# Patient Record
Sex: MALE | Race: WHITE | NOT HISPANIC OR LATINO | Employment: OTHER | URBAN - NONMETROPOLITAN AREA
[De-identification: names, ages, dates, MRNs, and addresses within clinical notes are randomized per-mention and may not be internally consistent; named-entity substitution may affect disease eponyms.]

---

## 2023-09-28 ENCOUNTER — HOSPITAL ENCOUNTER (EMERGENCY)
Facility: HOSPITAL | Age: 78
Discharge: HOME | End: 2023-09-28
Attending: EMERGENCY MEDICINE
Payer: COMMERCIAL

## 2023-09-28 ENCOUNTER — APPOINTMENT (EMERGENCY)
Dept: RADIOLOGY | Facility: HOSPITAL | Age: 78
End: 2023-09-28
Payer: COMMERCIAL

## 2023-09-28 ENCOUNTER — APPOINTMENT (EMERGENCY)
Dept: CT IMAGING | Facility: HOSPITAL | Age: 78
End: 2023-09-28
Payer: COMMERCIAL

## 2023-09-28 VITALS
DIASTOLIC BLOOD PRESSURE: 68 MMHG | HEART RATE: 69 BPM | RESPIRATION RATE: 16 BRPM | SYSTOLIC BLOOD PRESSURE: 134 MMHG | WEIGHT: 196.65 LBS | TEMPERATURE: 98.4 F | OXYGEN SATURATION: 93 %

## 2023-09-28 DIAGNOSIS — S22.31XA RIGHT RIB FRACTURE: Primary | ICD-10-CM

## 2023-09-28 LAB
ALBUMIN SERPL BCP-MCNC: 4.4 G/DL (ref 3.5–5)
ALP SERPL-CCNC: 84 U/L (ref 34–104)
ALT SERPL W P-5'-P-CCNC: 25 U/L (ref 7–52)
ANION GAP SERPL CALCULATED.3IONS-SCNC: 13 MMOL/L
APTT PPP: 35 SECONDS (ref 23–37)
AST SERPL W P-5'-P-CCNC: 14 U/L (ref 13–39)
BASOPHILS # BLD AUTO: 0.05 THOUSANDS/ÂΜL (ref 0–0.1)
BASOPHILS NFR BLD AUTO: 1 % (ref 0–1)
BILIRUB SERPL-MCNC: 0.49 MG/DL (ref 0.2–1)
BUN SERPL-MCNC: 32 MG/DL (ref 5–25)
CALCIUM SERPL-MCNC: 10 MG/DL (ref 8.4–10.2)
CARDIAC TROPONIN I PNL SERPL HS: 6 NG/L (ref 8–18)
CHLORIDE SERPL-SCNC: 101 MMOL/L (ref 96–108)
CO2 SERPL-SCNC: 22 MMOL/L (ref 21–32)
CREAT SERPL-MCNC: 1.38 MG/DL (ref 0.6–1.3)
EOSINOPHIL # BLD AUTO: 0.2 THOUSAND/ÂΜL (ref 0–0.61)
EOSINOPHIL NFR BLD AUTO: 3 % (ref 0–6)
ERYTHROCYTE [DISTWIDTH] IN BLOOD BY AUTOMATED COUNT: 12.5 % (ref 11.6–15.1)
GFR SERPL CREATININE-BSD FRML MDRD: 49 ML/MIN/1.73SQ M
GLUCOSE SERPL-MCNC: 240 MG/DL (ref 65–140)
HCT VFR BLD AUTO: 48 % (ref 36.5–49.3)
HGB BLD-MCNC: 15.4 G/DL (ref 12–17)
IMM GRANULOCYTES # BLD AUTO: 0.03 THOUSAND/UL (ref 0–0.2)
IMM GRANULOCYTES NFR BLD AUTO: 0 % (ref 0–2)
INR PPP: 1.23 (ref 0.84–1.19)
LYMPHOCYTES # BLD AUTO: 1.74 THOUSANDS/ÂΜL (ref 0.6–4.47)
LYMPHOCYTES NFR BLD AUTO: 22 % (ref 14–44)
MCH RBC QN AUTO: 29.4 PG (ref 26.8–34.3)
MCHC RBC AUTO-ENTMCNC: 32.1 G/DL (ref 31.4–37.4)
MCV RBC AUTO: 92 FL (ref 82–98)
MONOCYTES # BLD AUTO: 0.69 THOUSAND/ÂΜL (ref 0.17–1.22)
MONOCYTES NFR BLD AUTO: 9 % (ref 4–12)
NEUTROPHILS # BLD AUTO: 5.33 THOUSANDS/ÂΜL (ref 1.85–7.62)
NEUTS SEG NFR BLD AUTO: 65 % (ref 43–75)
NRBC BLD AUTO-RTO: 0 /100 WBCS
PLATELET # BLD AUTO: 214 THOUSANDS/UL (ref 149–390)
PMV BLD AUTO: 10.8 FL (ref 8.9–12.7)
POTASSIUM SERPL-SCNC: 4.2 MMOL/L (ref 3.5–5.3)
PROT SERPL-MCNC: 7.8 G/DL (ref 6.4–8.4)
PROTHROMBIN TIME: 15.4 SECONDS (ref 11.6–14.5)
RBC # BLD AUTO: 5.23 MILLION/UL (ref 3.88–5.62)
SODIUM SERPL-SCNC: 136 MMOL/L (ref 135–147)
WBC # BLD AUTO: 8.04 THOUSAND/UL (ref 4.31–10.16)

## 2023-09-28 PROCEDURE — 85610 PROTHROMBIN TIME: CPT | Performed by: PHYSICIAN ASSISTANT

## 2023-09-28 PROCEDURE — 71260 CT THORAX DX C+: CPT

## 2023-09-28 PROCEDURE — 72125 CT NECK SPINE W/O DYE: CPT

## 2023-09-28 PROCEDURE — 36415 COLL VENOUS BLD VENIPUNCTURE: CPT | Performed by: PHYSICIAN ASSISTANT

## 2023-09-28 PROCEDURE — 70486 CT MAXILLOFACIAL W/O DYE: CPT

## 2023-09-28 PROCEDURE — 99285 EMERGENCY DEPT VISIT HI MDM: CPT | Performed by: PHYSICIAN ASSISTANT

## 2023-09-28 PROCEDURE — 74177 CT ABD & PELVIS W/CONTRAST: CPT

## 2023-09-28 PROCEDURE — 99285 EMERGENCY DEPT VISIT HI MDM: CPT

## 2023-09-28 PROCEDURE — 85025 COMPLETE CBC W/AUTO DIFF WBC: CPT | Performed by: PHYSICIAN ASSISTANT

## 2023-09-28 PROCEDURE — 71045 X-RAY EXAM CHEST 1 VIEW: CPT

## 2023-09-28 PROCEDURE — 84484 ASSAY OF TROPONIN QUANT: CPT | Performed by: PHYSICIAN ASSISTANT

## 2023-09-28 PROCEDURE — 80053 COMPREHEN METABOLIC PANEL: CPT | Performed by: PHYSICIAN ASSISTANT

## 2023-09-28 PROCEDURE — 93005 ELECTROCARDIOGRAM TRACING: CPT

## 2023-09-28 PROCEDURE — 85730 THROMBOPLASTIN TIME PARTIAL: CPT | Performed by: PHYSICIAN ASSISTANT

## 2023-09-28 PROCEDURE — 70450 CT HEAD/BRAIN W/O DYE: CPT

## 2023-09-28 RX ADMIN — IOHEXOL 100 ML: 350 INJECTION, SOLUTION INTRAVENOUS at 13:53

## 2023-09-28 NOTE — ED PROVIDER NOTES
Emergency Department Trauma Note  Jeff Gordon 69 y.o. male MRN: [id number]  Unit/Bed#: NU95/PY64 Encounter: [id number]      Trauma Alert: Trauma Acuity: Trauma Evaluation  Model of Arrival: Mode of Arrival: Other (Comment) (private vehicle) via    Trauma Team: Current Providers  Attending Provider: Jacoby Gonzales MD  Physician Assistant: João Parkinson PA-C  Registered Nurse: Anne Price RN  Consultants:     None      History of Present Illness     Chief Complaint:   Chief Complaint   Patient presents with   • Fall     Fall 2 days ago. Lost balance, head strike, takes xarelto daily. Complains of right rib pain      HPI:  Jeff Gordon is a 69 y.o. male who presents with contusion periorbitally right-sided right-sided chest wall pain status post fall 2 days ago. Mechanism:Details of Incident: fall 2 days ago hit head on end table. no LOC. bruise to right eye, pain to right ribs Injury Date: 09/26/23   Injury Occurence Location - [address] [place] [address] Street: [facility]    This is a 78-year-old male who is apparently new to our network, presents via private vehicle accompanied by his son via wheelchair for evaluation of pain status post fall. I briefly reviewed his care everywhere from [facility] it appears though he is on aspirin and Xarelto history of right-sided hemiparesis from prior stroke. At bedside he is alert and oriented x4 he provides his own history he states that he had a fall 2 days ago. He states he lost his balance causing him to fall striking the right side of his body off of the floor. His chief complaint is right-sided rib pain. He has contusion noted periorbitally right side of the eye.   Denies back pain or subjective abdominal pain however there is right upper quadrant tenderness on exam.  He did stand and pivot for us he denies lower extremity pain he is full range of motion of the lower extremities based upon the fact that he is a 78-year-old male with a fall on Xarelto he was personally made trauma evaluation will require bedside chest x-ray as part of the trauma evaluation to rule out pneumothorax hemothorax. Review of Systems   Constitutional: Negative for chills and fever. HENT: Negative for ear pain and sore throat. Eyes: Negative for pain and visual disturbance. Respiratory: Negative for cough and shortness of breath. Cardiovascular: Positive for chest pain. Negative for palpitations. Right-sided chest wall pain   Gastrointestinal: Negative for abdominal pain and vomiting. Genitourinary: Negative for dysuria and hematuria. Musculoskeletal: Negative for arthralgias and back pain. Skin: Negative for color change and rash. Contusion periorbitally right-sided   Neurological: Negative for seizures and syncope. All other systems reviewed and are negative. Historical Information     Immunizations:   Immunization History   Administered Date(s) Administered   • COVID-19 PFIZER VACCINE 0.3 ML IM 03/16/2021, 04/06/2021       History reviewed. No pertinent past medical history. History reviewed. No pertinent family history. History reviewed. No pertinent surgical history.   Social History     Tobacco Use   • Smoking status: Never   • Smokeless tobacco: Never     E-Cigarette/Vaping     E-Cigarette/Vaping Substances       Family History: non-contributory    Meds/Allergies   None       No Known Allergies    PHYSICAL EXAM    PE limited by: None    Objective   Vitals:   First set: Temperature: 98.4 °F (36.9 °C) (09/28/23 1330)  Pulse: 65 (09/28/23 1330)  Respirations: 18 (09/28/23 1330)  Blood Pressure: 150/73 (09/28/23 1330)  SpO2: 96 % (09/28/23 1330)    Primary Survey:   (A) Airway: Patent clear talking  (B) Breathing: Clear to auscultation bilaterally trachea midline no JVD  (C) Circulation: Pulses:   radial  4/4  (D) Disabliity:  GCS Total:  15  (E) Expose:  Completed    Secondary Survey: (Click on Physical Exam tab above)  Physical Exam  Vitals reviewed. Constitutional:       General: He is not in acute distress. Appearance: Normal appearance. He is normal weight. He is not ill-appearing, toxic-appearing or diaphoretic. HENT:      Head: Normocephalic and atraumatic. Comments: No evidence of Staples sign     Right Ear: Tympanic membrane, ear canal and external ear normal. There is no impacted cerumen. Left Ear: Tympanic membrane, ear canal and external ear normal. There is no impacted cerumen. Ears:      Comments: Negative hemotympanum bilaterally. No blood in the external canals. Nose: Nose normal. No congestion or rhinorrhea. Comments: Negative septal hematoma. Mouth/Throat:      Mouth: Mucous membranes are moist.      Pharynx: Oropharynx is clear. No oropharyngeal exudate or posterior oropharyngeal erythema. Eyes:      General:         Right eye: No discharge. Left eye: No discharge. Extraocular Movements: Extraocular movements intact. Conjunctiva/sclera: Conjunctivae normal.      Pupils: Pupils are equal, round, and reactive to light. Cardiovascular:      Rate and Rhythm: Normal rate and regular rhythm. Pulses: Normal pulses. Heart sounds: No murmur heard. No gallop. Pulmonary:      Effort: Pulmonary effort is normal. No respiratory distress. Breath sounds: Normal breath sounds. No stridor. No wheezing, rhonchi or rales. Comments: No evidence of flail chest.  Symmetric chest rise is noted. No jugular venous distension. Right-sided chest wall tenderness without contusion abrasion laceration or swelling  Chest:      Chest wall: Tenderness present. Abdominal:      General: Abdomen is flat. Bowel sounds are normal. There is no distension. Palpations: There is no mass. Tenderness: There is no abdominal tenderness. There is no right CVA tenderness, left CVA tenderness, guarding or rebound. Hernia: No hernia is present.       Comments: Right upper quadrant abdominal tenderness   Musculoskeletal:         General: Swelling and tenderness present. No deformity. Normal range of motion. Cervical back: Normal range of motion. No rigidity or tenderness. Right lower leg: No edema. Left lower leg: No edema. Comments: No evidence of central spinal tenderness. No crepitus step-offs contusion abrasion laceration or swelling      There is tenderness soft tissue swelling noted of the right periorbital region. No active bleeding. There is no central spinal tenderness, no evidence of depressed skull fracture on physical examination. Skin:     General: Skin is warm. Capillary Refill: Capillary refill takes less than 2 seconds. Coloration: Skin is not jaundiced or pale. Findings: No bruising, erythema, lesion or rash. Neurological:      General: No focal deficit present. Mental Status: He is alert and oriented to person, place, and time. Mental status is at baseline. Psychiatric:         Mood and Affect: Mood normal.     Right upper extremity hemiparesis is noted, chronic baseline. No other acute neurologic deficits noted    Cervical spine cleared by clinical criteria? No (imaging required)    Patient's cervical spine was personally evaluated. There is no evidence of contusion, abrasion, laceration or swelling. No cervical spine tenderness. Patient has full range of motion both actively and passively against resistance without pain elicited. Cervical spine is considered clear.   Invasive Devices     Peripheral Intravenous Line  Duration           Peripheral IV 09/28/23 Left;Upper Arm <1 day                Lab Results:   Results Reviewed     Procedure Component Value Units Date/Time    High Sensitivity Troponin I Random [[id number]]  (Abnormal) Collected: 09/28/23 1340    Lab Status: Final result Specimen: Blood from Arm, Left Updated: 09/28/23 1406     HS TnI random 6 ng/L     Comprehensive metabolic panel [[id number]] (Abnormal) Collected: 09/28/23 1340    Lab Status: Final result Specimen: Blood from Arm, Left Updated: 09/28/23 1400     Sodium 136 mmol/L      Potassium 4.2 mmol/L      Chloride 101 mmol/L      CO2 22 mmol/L      ANION GAP 13 mmol/L      BUN 32 mg/dL      Creatinine 1.38 mg/dL      Glucose 240 mg/dL      Calcium 10.0 mg/dL      AST 14 U/L      ALT 25 U/L      Alkaline Phosphatase 84 U/L      Total Protein 7.8 g/dL      Albumin 4.4 g/dL      Total Bilirubin 0.49 mg/dL      eGFR 49 ml/min/1.73sq m     Narrative:      [facility] guidelines for Chronic Kidney Disease (CKD):   •  Stage 1 with normal or high GFR (GFR > 90 mL/min/1.73 square meters)  •  Stage 2 Mild CKD (GFR = 60-89 mL/min/1.73 square meters)  •  Stage 3A Moderate CKD (GFR = 45-59 mL/min/1.73 square meters)  •  Stage 3B Moderate CKD (GFR = 30-44 mL/min/1.73 square meters)  •  Stage 4 Severe CKD (GFR = 15-29 mL/min/1.73 square meters)  •  Stage 5 End Stage CKD (GFR <15 mL/min/1.73 square meters)  Note: GFR calculation is accurate only with a steady state creatinine    APTT [[id number]]  (Normal) Collected: 09/28/23 1340    Lab Status: Final result Specimen: Blood from Arm, Left Updated: 09/28/23 1358     PTT 35 seconds     Protime-INR [[id number]]  (Abnormal) Collected: 09/28/23 1340    Lab Status: Final result Specimen: Blood from Arm, Left Updated: 09/28/23 1358     Protime 15.4 seconds      INR 1.23    CBC and differential [[id number]] Collected: 09/28/23 1340    Lab Status: Final result Specimen: Blood from Arm, Left Updated: 09/28/23 1344     WBC 8.04 Thousand/uL      RBC 5.23 Million/uL      Hemoglobin 15.4 g/dL      Hematocrit 48.0 %      MCV 92 fL      MCH 29.4 pg      MCHC 32.1 g/dL      RDW 12.5 %      MPV 10.8 fL      Platelets 418 Thousands/uL      nRBC 0 /100 WBCs      Neutrophils Relative 65 %      Immat GRANS % 0 %      Lymphocytes Relative 22 %      Monocytes Relative 9 %      Eosinophils Relative 3 %      Basophils Relative 1 %      Neutrophils Absolute 5.33 Thousands/µL      Immature Grans Absolute 0.03 Thousand/uL      Lymphocytes Absolute 1.74 Thousands/µL      Monocytes Absolute 0.69 Thousand/µL      Eosinophils Absolute 0.20 Thousand/µL      Basophils Absolute 0.05 Thousands/µL     UA (URINE) with reflex to Scope [[id number]]     Lab Status: No result Specimen: Urine                  Imaging Studies:   Direct to CT: No  TRAUMA - CT head wo contrast   Final Result by Paul Cullen MD (09/28 1421)      No acute intracranial abnormality. Chronic left encephalomalacia. Workstation performed: [id number]         TRAUMA - CT spine cervical wo contrast   Final Result by Paul Cullen MD (09/28 1424)      No cervical spine fracture or traumatic malalignment. Workstation performed: [id number]         TRAUMA - CT chest abdomen pelvis w contrast   Final Result by Paul Cullen MD (09/28 1440)   Subtle questionable nondisplaced fractures of the posterior right 10th and 11th ribs. Prior healed fractures noted as well. Otherwise no acute thoracic or abdominal pelvic trauma. Right pleural thickening and partial pleural calcification in the posterior thorax suggesting sequela of prior insult, possibly related to prior hemothorax or empyema. The study was marked in [facility] for immediate notification. Workstation performed: [id number]         TRAUMA - CT facial bones wo contrast   Final Result by Paul Cullen MD (09/28 1427)      Normal noncontrast CT of the facial bones. Workstation performed: [id number]         XR Trauma chest portable   Final Result by Maddi Schulte MD (09/28 1450)      No acute cardiopulmonary disease.                   Workstation performed: [id number]               Procedures  ECG 12 Lead Documentation Only    Date/Time: 9/28/2023 1:41 PM    Performed by: João Parkinson YKLE  Authorized by: Rafat Charles PA-C    Indications / Diagnosis:  Trauma  ECG reviewed by me, the ED Provider: yes    Patient location:  ED  Previous ECG:     Previous ECG:  Unavailable  Interpretation:     Interpretation: normal    Rate:     ECG rate:  66    ECG rate assessment: normal    Rhythm:     Rhythm: sinus rhythm    Ectopy:     Ectopy: none    QRS:     QRS axis:  Normal    QRS intervals:  Normal  Conduction:     Conduction: normal    ST segments:     ST segments:  Normal  T waves:     T waves: normal               ED Course  ED Course as of 09/28/23 1521   Thu Sep 28, 2023   1404 CBC reviewed there is no leukocytosis or anemia no thrombocytopenia coags within reasonable limits CMP reviewed there is elevation of creatinine however unsure if this is his chronic baseline he has never been to this facility before. No electrolyte disturbance. 1412 HS TnI random(!): 6   1412 Lab within reasonable limits, awaiting urine sample and imaging interps   1428 CT scan of the cervical spine and head are without acute traumatic process, awaiting CT chest abdomen pelvis and facial bones. 1429 Facial bones without fracture per interpretation   1455 IMPRESSION:  Subtle questionable nondisplaced fractures of the posterior right 10th and 11th ribs. Prior healed fractures noted as well.     Otherwise no acute thoracic or abdominal pelvic trauma.     Right pleural thickening and partial pleural calcification in the posterior thorax suggesting sequela of prior insult, possibly related to prior hemothorax or empyema.     1455 FINDINGS:     Cardiomediastinal silhouette appears unremarkable.     The lungs are clear. No pneumothorax or pleural effusion.     Osseous structures appear within normal limits for patient age.     IMPRESSION:     No acute cardiopulmonary disease. Medical Decision Making  15-year-old male trauma evaluation. Fall 2 days ago provides own history.   Right-sided facial contusion subjective right-sided chest wall pain. Has abdominal pain on examination no cervical spine tenderness he will be evaluated for presence of intracranial hemorrhage, fracture of the skull, orbital fracture, pneumothorax, hemothorax, pulmonary contusion, posttraumatic pneumonia, acute hemorrhage of the abdomen in terms of the liver laceration or hematoma. He will have medical evaluation for urinary tract infection electrolyte disturbance as well as acute kidney injury. Traumatic evaluation is complete. CT scan of the head and facial bones not reveal fracture or bleeding, cervical spine is without malalignment or fracture, chest abdomen pelvis reveals what is likely nondisplaced acute right posterior lateral 10th and 11th rib fractures however no active acute hemothorax no pneumothorax. There does appear to be prior evidence of pleural thickening. Old rib fracture is healing as well this likely may be from the same event. Laboratory evaluation within reasonable limits. Son is at bedside now. Repeat evaluation does not reveal any shortness of breath his oxygen saturation is 96% on room air. He is stable for discharge home. Amount and/or Complexity of Data Reviewed  Labs: ordered. Decision-making details documented in ED Course. Radiology: ordered. Risk  Prescription drug management.                   Disposition  [facility] Transfer: No  Final diagnoses:   Right rib fracture - 10 and 11 nondisplaced     Time reflects when diagnosis was documented in both MDM as applicable and the Disposition within this note     Time User Action Codes Description Comment    9/28/2023  2:58 PM Selin COOK Add Lenore Sb Right rib fracture     9/28/2023  2:58 PM Jessica Wilkerson Modify Lenore Sb Right rib fracture 10 and 11 nondisplaced      ED Disposition     ED Disposition   Discharge    Condition   Stable    Date/Time   Thu Sep 28, 2023  2:57 PM    Comment   Rizwana  [place] discharge to home/self care.               Follow-up Information     Follow up With Specialties Details Why Contact Info    PCP  Schedule an appointment as soon as possible for a visit           Patient's Medications    No medications on file     No discharge procedures on file.     PDMP Review     None          ED Provider  Electronically Signed by         Meagan Sepulveda PA-C  09/28/23 1522

## 2023-09-29 LAB
ATRIAL RATE: 66 BPM
P AXIS: 42 DEGREES
PR INTERVAL: 146 MS
QRS AXIS: 41 DEGREES
QRSD INTERVAL: 72 MS
QT INTERVAL: 400 MS
QTC INTERVAL: 419 MS
T WAVE AXIS: 79 DEGREES
VENTRICULAR RATE: 66 BPM

## 2023-09-29 PROCEDURE — 93010 ELECTROCARDIOGRAM REPORT: CPT | Performed by: INTERNAL MEDICINE

## 2024-10-24 ENCOUNTER — APPOINTMENT (EMERGENCY)
Dept: RADIOLOGY | Facility: HOSPITAL | Age: 79
DRG: 562 | End: 2024-10-24
Payer: MEDICARE

## 2024-10-24 ENCOUNTER — APPOINTMENT (EMERGENCY)
Dept: CT IMAGING | Facility: HOSPITAL | Age: 79
DRG: 562 | End: 2024-10-24
Payer: MEDICARE

## 2024-10-24 ENCOUNTER — HOSPITAL ENCOUNTER (INPATIENT)
Facility: HOSPITAL | Age: 79
LOS: 3 days | Discharge: NURSING, REHAB OR LONG-TERM CARE | DRG: 562 | End: 2024-10-28
Attending: EMERGENCY MEDICINE | Admitting: INTERNAL MEDICINE
Payer: MEDICARE

## 2024-10-24 DIAGNOSIS — T07.XXXA FRACTURES INVOLVING MULTIPLE BODY REGIONS: Primary | ICD-10-CM

## 2024-10-24 DIAGNOSIS — S62.332A: ICD-10-CM

## 2024-10-24 DIAGNOSIS — T14.8XXA CONTUSION: ICD-10-CM

## 2024-10-24 DIAGNOSIS — S62.511A: ICD-10-CM

## 2024-10-24 DIAGNOSIS — E87.20 METABOLIC ACIDOSIS: ICD-10-CM

## 2024-10-24 DIAGNOSIS — S42.301A RIGHT HUMERAL FRACTURE: ICD-10-CM
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
## 2024-10-24 DIAGNOSIS — E87.1 HYPONATREMIA: ICD-10-CM

## 2024-10-24 DIAGNOSIS — S42.302A LEFT HUMERAL FRACTURE: ICD-10-CM

## 2024-10-24 LAB
ABO GROUP BLD: NORMAL
ALBUMIN SERPL BCG-MCNC: 4.1 G/DL (ref 3.5–5)
ALP SERPL-CCNC: 80 U/L (ref 34–104)
ALT SERPL W P-5'-P-CCNC: 17 U/L (ref 7–52)
ANION GAP SERPL CALCULATED.3IONS-SCNC: 15 MMOL/L (ref 4–13)
APTT PPP: 29 SECONDS (ref 23–34)
AST SERPL W P-5'-P-CCNC: 18 U/L (ref 13–39)
BASOPHILS # BLD AUTO: 0.04 THOUSANDS/ΜL (ref 0–0.1)
BASOPHILS NFR BLD AUTO: 1 % (ref 0–1)
BILIRUB SERPL-MCNC: 0.98 MG/DL (ref 0.2–1)
BLD GP AB SCN SERPL QL: NEGATIVE
BUN SERPL-MCNC: 35 MG/DL (ref 5–25)
CALCIUM SERPL-MCNC: 9.8 MG/DL (ref 8.4–10.2)
CHLORIDE SERPL-SCNC: 100 MMOL/L (ref 96–108)
CO2 SERPL-SCNC: 19 MMOL/L (ref 21–32)
CREAT SERPL-MCNC: 1.48 MG/DL (ref 0.6–1.3)
EOSINOPHIL # BLD AUTO: 0.14 THOUSAND/ΜL (ref 0–0.61)
EOSINOPHIL NFR BLD AUTO: 2 % (ref 0–6)
ERYTHROCYTE [DISTWIDTH] IN BLOOD BY AUTOMATED COUNT: 12.9 % (ref 11.6–15.1)
GFR SERPL CREATININE-BSD FRML MDRD: 44 ML/MIN/1.73SQ M
GLUCOSE SERPL-MCNC: 274 MG/DL (ref 65–140)
HCT VFR BLD AUTO: 41 % (ref 36.5–49.3)
HGB BLD-MCNC: 13.7 G/DL (ref 12–17)
IMM GRANULOCYTES # BLD AUTO: 0.1 THOUSAND/UL (ref 0–0.2)
IMM GRANULOCYTES NFR BLD AUTO: 1 % (ref 0–2)
INR PPP: 1.05 (ref 0.85–1.19)
LYMPHOCYTES # BLD AUTO: 2.1 THOUSANDS/ΜL (ref 0.6–4.47)
LYMPHOCYTES NFR BLD AUTO: 27 % (ref 14–44)
MAGNESIUM SERPL-MCNC: 1.8 MG/DL (ref 1.9–2.7)
MCH RBC QN AUTO: 30.2 PG (ref 26.8–34.3)
MCHC RBC AUTO-ENTMCNC: 33.4 G/DL (ref 31.4–37.4)
MCV RBC AUTO: 90 FL (ref 82–98)
MONOCYTES # BLD AUTO: 0.75 THOUSAND/ΜL (ref 0.17–1.22)
MONOCYTES NFR BLD AUTO: 10 % (ref 4–12)
NEUTROPHILS # BLD AUTO: 4.6 THOUSANDS/ΜL (ref 1.85–7.62)
NEUTS SEG NFR BLD AUTO: 59 % (ref 43–75)
NRBC BLD AUTO-RTO: 0 /100 WBCS
PHOSPHATE SERPL-MCNC: 2.5 MG/DL (ref 2.3–4.1)
PLATELET # BLD AUTO: 243 THOUSANDS/UL (ref 149–390)
PMV BLD AUTO: 10.7 FL (ref 8.9–12.7)
POTASSIUM SERPL-SCNC: 4.9 MMOL/L (ref 3.5–5.3)
PROT SERPL-MCNC: 7.3 G/DL (ref 6.4–8.4)
PROTHROMBIN TIME: 14.2 SECONDS (ref 12.3–15)
RBC # BLD AUTO: 4.54 MILLION/UL (ref 3.88–5.62)
RH BLD: POSITIVE
SODIUM SERPL-SCNC: 134 MMOL/L (ref 135–147)
SPECIMEN EXPIRATION DATE: NORMAL
WBC # BLD AUTO: 7.73 THOUSAND/UL (ref 4.31–10.16)

## 2024-10-24 PROCEDURE — 85610 PROTHROMBIN TIME: CPT | Performed by: EMERGENCY MEDICINE

## 2024-10-24 PROCEDURE — 83735 ASSAY OF MAGNESIUM: CPT | Performed by: EMERGENCY MEDICINE

## 2024-10-24 PROCEDURE — 85025 COMPLETE CBC W/AUTO DIFF WBC: CPT | Performed by: EMERGENCY MEDICINE

## 2024-10-24 PROCEDURE — 72125 CT NECK SPINE W/O DYE: CPT

## 2024-10-24 PROCEDURE — 93005 ELECTROCARDIOGRAM TRACING: CPT

## 2024-10-24 PROCEDURE — 29125 APPL SHORT ARM SPLINT STATIC: CPT | Performed by: EMERGENCY MEDICINE

## 2024-10-24 PROCEDURE — 73130 X-RAY EXAM OF HAND: CPT

## 2024-10-24 PROCEDURE — 86900 BLOOD TYPING SEROLOGIC ABO: CPT | Performed by: EMERGENCY MEDICINE

## 2024-10-24 PROCEDURE — 80053 COMPREHEN METABOLIC PANEL: CPT | Performed by: EMERGENCY MEDICINE

## 2024-10-24 PROCEDURE — 73030 X-RAY EXAM OF SHOULDER: CPT

## 2024-10-24 PROCEDURE — 99285 EMERGENCY DEPT VISIT HI MDM: CPT | Performed by: EMERGENCY MEDICINE

## 2024-10-24 PROCEDURE — 73090 X-RAY EXAM OF FOREARM: CPT

## 2024-10-24 PROCEDURE — 96375 TX/PRO/DX INJ NEW DRUG ADDON: CPT

## 2024-10-24 PROCEDURE — 86901 BLOOD TYPING SEROLOGIC RH(D): CPT | Performed by: EMERGENCY MEDICINE

## 2024-10-24 PROCEDURE — 73060 X-RAY EXAM OF HUMERUS: CPT

## 2024-10-24 PROCEDURE — 96365 THER/PROPH/DIAG IV INF INIT: CPT

## 2024-10-24 PROCEDURE — 74177 CT ABD & PELVIS W/CONTRAST: CPT

## 2024-10-24 PROCEDURE — 70450 CT HEAD/BRAIN W/O DYE: CPT

## 2024-10-24 PROCEDURE — 86850 RBC ANTIBODY SCREEN: CPT | Performed by: EMERGENCY MEDICINE

## 2024-10-24 PROCEDURE — 71260 CT THORAX DX C+: CPT

## 2024-10-24 PROCEDURE — 73110 X-RAY EXAM OF WRIST: CPT

## 2024-10-24 PROCEDURE — 36415 COLL VENOUS BLD VENIPUNCTURE: CPT | Performed by: EMERGENCY MEDICINE

## 2024-10-24 PROCEDURE — 84100 ASSAY OF PHOSPHORUS: CPT | Performed by: EMERGENCY MEDICINE

## 2024-10-24 PROCEDURE — 99285 EMERGENCY DEPT VISIT HI MDM: CPT

## 2024-10-24 PROCEDURE — 85730 THROMBOPLASTIN TIME PARTIAL: CPT | Performed by: EMERGENCY MEDICINE

## 2024-10-24 PROCEDURE — 1123F ACP DISCUSS/DSCN MKR DOCD: CPT | Performed by: EMERGENCY MEDICINE

## 2024-10-24 RX ORDER — ACETAMINOPHEN 160 MG
2000 TABLET,DISINTEGRATING ORAL EVERY MORNING
COMMUNITY

## 2024-10-24 RX ORDER — EMPAGLIFLOZIN 10 MG/1
1 TABLET, FILM COATED ORAL DAILY
COMMUNITY
Start: 2024-10-13

## 2024-10-24 RX ORDER — SEMAGLUTIDE 0.68 MG/ML
INJECTION, SOLUTION SUBCUTANEOUS
COMMUNITY
Start: 2024-08-28

## 2024-10-24 RX ORDER — AMLODIPINE AND BENAZEPRIL HYDROCHLORIDE 5; 20 MG/1; MG/1
1 CAPSULE ORAL DAILY
COMMUNITY
End: 2024-10-28

## 2024-10-24 RX ORDER — CITALOPRAM HYDROBROMIDE 40 MG/1
40 TABLET ORAL DAILY
COMMUNITY
Start: 2024-07-01

## 2024-10-24 RX ORDER — METOPROLOL SUCCINATE 25 MG/1
25 TABLET, EXTENDED RELEASE ORAL DAILY
COMMUNITY
Start: 2024-10-21

## 2024-10-24 RX ORDER — FENTANYL CITRATE 50 UG/ML
50 INJECTION, SOLUTION INTRAMUSCULAR; INTRAVENOUS ONCE
Status: COMPLETED | OUTPATIENT
Start: 2024-10-24 | End: 2024-10-24

## 2024-10-24 RX ORDER — MAGNESIUM SULFATE HEPTAHYDRATE 40 MG/ML
2 INJECTION, SOLUTION INTRAVENOUS ONCE
Status: COMPLETED | OUTPATIENT
Start: 2024-10-24 | End: 2024-10-24

## 2024-10-24 RX ORDER — KETAMINE HCL IN NACL, ISO-OSM 100MG/10ML
15 SYRINGE (ML) INJECTION ONCE AS NEEDED
Status: DISCONTINUED | OUTPATIENT
Start: 2024-10-24 | End: 2024-10-24

## 2024-10-24 RX ORDER — HYDROMORPHONE HCL/PF 1 MG/ML
0.5 SYRINGE (ML) INJECTION ONCE
Status: COMPLETED | OUTPATIENT
Start: 2024-10-24 | End: 2024-10-24

## 2024-10-24 RX ORDER — KETAMINE HCL IN NACL, ISO-OSM 100MG/10ML
10 SYRINGE (ML) INJECTION ONCE AS NEEDED
Status: COMPLETED | OUTPATIENT
Start: 2024-10-24 | End: 2024-10-24

## 2024-10-24 RX ORDER — ATORVASTATIN CALCIUM 40 MG/1
40 TABLET, FILM COATED ORAL EVERY MORNING
COMMUNITY

## 2024-10-24 RX ADMIN — FENTANYL CITRATE 50 MCG: 50 INJECTION INTRAMUSCULAR; INTRAVENOUS at 21:27

## 2024-10-24 RX ADMIN — IOHEXOL 100 ML: 350 INJECTION, SOLUTION INTRAVENOUS at 22:14

## 2024-10-24 RX ADMIN — HYDROMORPHONE HYDROCHLORIDE 0.5 MG: 1 INJECTION, SOLUTION INTRAMUSCULAR; INTRAVENOUS; SUBCUTANEOUS at 23:51

## 2024-10-24 RX ADMIN — MAGNESIUM SULFATE HEPTAHYDRATE 2 G: 40 INJECTION, SOLUTION INTRAVENOUS at 22:48

## 2024-10-24 RX ADMIN — Medication 10 MG: at 21:59

## 2024-10-25 PROBLEM — S62.332A: Status: ACTIVE | Noted: 2024-10-25

## 2024-10-25 PROBLEM — K21.9 GERD (GASTROESOPHAGEAL REFLUX DISEASE): Status: ACTIVE | Noted: 2024-10-25

## 2024-10-25 PROBLEM — S42.201A CLOSED FRACTURE OF RIGHT PROXIMAL HUMERUS: Status: ACTIVE | Noted: 2024-10-25

## 2024-10-25 PROBLEM — E11.9 TYPE 2 DIABETES MELLITUS (HCC): Status: ACTIVE | Noted: 2024-10-25

## 2024-10-25 PROBLEM — I10 PRIMARY HYPERTENSION: Status: ACTIVE | Noted: 2024-10-25

## 2024-10-25 PROBLEM — F32.9 MAJOR DEPRESSIVE DISORDER: Status: ACTIVE | Noted: 2024-10-25

## 2024-10-25 PROBLEM — T07.XXXA FRACTURES INVOLVING MULTIPLE BODY REGIONS: Status: ACTIVE | Noted: 2024-10-25

## 2024-10-25 LAB
ABO GROUP BLD: NORMAL
ANION GAP SERPL CALCULATED.3IONS-SCNC: 10 MMOL/L (ref 4–13)
ATRIAL RATE: 74 BPM
B-OH-BUTYR SERPL-MCNC: 0.1 MMOL/L (ref 0.02–0.27)
BASOPHILS # BLD AUTO: 0.06 THOUSANDS/ΜL (ref 0–0.1)
BASOPHILS NFR BLD AUTO: 1 % (ref 0–1)
BUN SERPL-MCNC: 36 MG/DL (ref 5–25)
CALCIUM SERPL-MCNC: 9.6 MG/DL (ref 8.4–10.2)
CHLORIDE SERPL-SCNC: 104 MMOL/L (ref 96–108)
CO2 SERPL-SCNC: 24 MMOL/L (ref 21–32)
CREAT SERPL-MCNC: 1.46 MG/DL (ref 0.6–1.3)
EOSINOPHIL # BLD AUTO: 0.2 THOUSAND/ΜL (ref 0–0.61)
EOSINOPHIL NFR BLD AUTO: 3 % (ref 0–6)
ERYTHROCYTE [DISTWIDTH] IN BLOOD BY AUTOMATED COUNT: 13 % (ref 11.6–15.1)
EST. AVERAGE GLUCOSE BLD GHB EST-MCNC: 269 MG/DL
GFR SERPL CREATININE-BSD FRML MDRD: 45 ML/MIN/1.73SQ M
GLUCOSE SERPL-MCNC: 180 MG/DL (ref 65–140)
GLUCOSE SERPL-MCNC: 182 MG/DL (ref 65–140)
GLUCOSE SERPL-MCNC: 194 MG/DL (ref 65–140)
GLUCOSE SERPL-MCNC: 209 MG/DL (ref 65–140)
GLUCOSE SERPL-MCNC: 217 MG/DL (ref 65–140)
GLUCOSE SERPL-MCNC: 296 MG/DL (ref 65–140)
HBA1C MFR BLD: 11 %
HCT VFR BLD AUTO: 38.2 % (ref 36.5–49.3)
HGB BLD-MCNC: 12.8 G/DL (ref 12–17)
IMM GRANULOCYTES # BLD AUTO: 0.08 THOUSAND/UL (ref 0–0.2)
IMM GRANULOCYTES NFR BLD AUTO: 1 % (ref 0–2)
LYMPHOCYTES # BLD AUTO: 2.18 THOUSANDS/ΜL (ref 0.6–4.47)
LYMPHOCYTES NFR BLD AUTO: 31 % (ref 14–44)
MAGNESIUM SERPL-MCNC: 2.5 MG/DL (ref 1.9–2.7)
MCH RBC QN AUTO: 29.6 PG (ref 26.8–34.3)
MCHC RBC AUTO-ENTMCNC: 33.5 G/DL (ref 31.4–37.4)
MCV RBC AUTO: 88 FL (ref 82–98)
MONOCYTES # BLD AUTO: 0.75 THOUSAND/ΜL (ref 0.17–1.22)
MONOCYTES NFR BLD AUTO: 11 % (ref 4–12)
NEUTROPHILS # BLD AUTO: 3.73 THOUSANDS/ΜL (ref 1.85–7.62)
NEUTS SEG NFR BLD AUTO: 53 % (ref 43–75)
NRBC BLD AUTO-RTO: 0 /100 WBCS
P AXIS: 61 DEGREES
PHOSPHATE SERPL-MCNC: 3.7 MG/DL (ref 2.3–4.1)
PLATELET # BLD AUTO: 222 THOUSANDS/UL (ref 149–390)
PMV BLD AUTO: 10.7 FL (ref 8.9–12.7)
POTASSIUM SERPL-SCNC: 4.3 MMOL/L (ref 3.5–5.3)
PR INTERVAL: 130 MS
QRS AXIS: 62 DEGREES
QRSD INTERVAL: 70 MS
QT INTERVAL: 420 MS
QTC INTERVAL: 466 MS
RBC # BLD AUTO: 4.32 MILLION/UL (ref 3.88–5.62)
RH BLD: POSITIVE
SODIUM SERPL-SCNC: 138 MMOL/L (ref 135–147)
T WAVE AXIS: 24 DEGREES
VENTRICULAR RATE: 74 BPM
WBC # BLD AUTO: 7 THOUSAND/UL (ref 4.31–10.16)

## 2024-10-25 PROCEDURE — 93010 ELECTROCARDIOGRAM REPORT: CPT | Performed by: INTERNAL MEDICINE

## 2024-10-25 PROCEDURE — 82010 KETONE BODYS QUAN: CPT | Performed by: INTERNAL MEDICINE

## 2024-10-25 PROCEDURE — 99222 1ST HOSP IP/OBS MODERATE 55: CPT | Performed by: STUDENT IN AN ORGANIZED HEALTH CARE EDUCATION/TRAINING PROGRAM

## 2024-10-25 PROCEDURE — 23600 CLTX PROX HUMRL FX W/O MNPJ: CPT | Performed by: STUDENT IN AN ORGANIZED HEALTH CARE EDUCATION/TRAINING PROGRAM

## 2024-10-25 PROCEDURE — 80048 BASIC METABOLIC PNL TOTAL CA: CPT | Performed by: INTERNAL MEDICINE

## 2024-10-25 PROCEDURE — 82948 REAGENT STRIP/BLOOD GLUCOSE: CPT

## 2024-10-25 PROCEDURE — 97163 PT EVAL HIGH COMPLEX 45 MIN: CPT

## 2024-10-25 PROCEDURE — 83036 HEMOGLOBIN GLYCOSYLATED A1C: CPT | Performed by: INTERNAL MEDICINE

## 2024-10-25 PROCEDURE — 85025 COMPLETE CBC W/AUTO DIFF WBC: CPT | Performed by: INTERNAL MEDICINE

## 2024-10-25 PROCEDURE — 99223 1ST HOSP IP/OBS HIGH 75: CPT | Performed by: INTERNAL MEDICINE

## 2024-10-25 PROCEDURE — 83735 ASSAY OF MAGNESIUM: CPT | Performed by: INTERNAL MEDICINE

## 2024-10-25 PROCEDURE — 97167 OT EVAL HIGH COMPLEX 60 MIN: CPT

## 2024-10-25 PROCEDURE — 84100 ASSAY OF PHOSPHORUS: CPT | Performed by: INTERNAL MEDICINE

## 2024-10-25 RX ORDER — SODIUM CHLORIDE 9 MG/ML
75 INJECTION, SOLUTION INTRAVENOUS CONTINUOUS
Status: DISCONTINUED | OUTPATIENT
Start: 2024-10-25 | End: 2024-10-26

## 2024-10-25 RX ORDER — INSULIN LISPRO 100 [IU]/ML
1-6 INJECTION, SOLUTION INTRAVENOUS; SUBCUTANEOUS
Status: DISCONTINUED | OUTPATIENT
Start: 2024-10-25 | End: 2024-10-28 | Stop reason: HOSPADM

## 2024-10-25 RX ORDER — LISINOPRIL 20 MG/1
20 TABLET ORAL DAILY
Status: DISCONTINUED | OUTPATIENT
Start: 2024-10-25 | End: 2024-10-25

## 2024-10-25 RX ORDER — AMLODIPINE BESYLATE 5 MG/1
5 TABLET ORAL DAILY
Status: DISCONTINUED | OUTPATIENT
Start: 2024-10-25 | End: 2024-10-25

## 2024-10-25 RX ORDER — HEPARIN SODIUM 5000 [USP'U]/ML
5000 INJECTION, SOLUTION INTRAVENOUS; SUBCUTANEOUS EVERY 8 HOURS SCHEDULED
Status: DISCONTINUED | OUTPATIENT
Start: 2024-10-25 | End: 2024-10-28 | Stop reason: HOSPADM

## 2024-10-25 RX ORDER — METOPROLOL SUCCINATE 25 MG/1
25 TABLET, EXTENDED RELEASE ORAL DAILY
Status: DISCONTINUED | OUTPATIENT
Start: 2024-10-25 | End: 2024-10-28 | Stop reason: HOSPADM

## 2024-10-25 RX ORDER — OXYCODONE HYDROCHLORIDE 5 MG/1
5 TABLET ORAL EVERY 6 HOURS PRN
Status: DISCONTINUED | OUTPATIENT
Start: 2024-10-25 | End: 2024-10-28 | Stop reason: HOSPADM

## 2024-10-25 RX ORDER — OXYCODONE HYDROCHLORIDE 10 MG/1
10 TABLET ORAL EVERY 6 HOURS PRN
Status: DISCONTINUED | OUTPATIENT
Start: 2024-10-25 | End: 2024-10-28 | Stop reason: HOSPADM

## 2024-10-25 RX ORDER — ASPIRIN 81 MG/1
81 TABLET, CHEWABLE ORAL DAILY
Status: DISCONTINUED | OUTPATIENT
Start: 2024-10-25 | End: 2024-10-28 | Stop reason: HOSPADM

## 2024-10-25 RX ORDER — ONDANSETRON 2 MG/ML
4 INJECTION INTRAMUSCULAR; INTRAVENOUS EVERY 6 HOURS PRN
Status: DISCONTINUED | OUTPATIENT
Start: 2024-10-25 | End: 2024-10-28 | Stop reason: HOSPADM

## 2024-10-25 RX ORDER — PANTOPRAZOLE SODIUM 40 MG/1
40 TABLET, DELAYED RELEASE ORAL
Status: DISCONTINUED | OUTPATIENT
Start: 2024-10-25 | End: 2024-10-28 | Stop reason: HOSPADM

## 2024-10-25 RX ORDER — ATORVASTATIN CALCIUM 40 MG/1
40 TABLET, FILM COATED ORAL
Status: DISCONTINUED | OUTPATIENT
Start: 2024-10-25 | End: 2024-10-28 | Stop reason: HOSPADM

## 2024-10-25 RX ORDER — CITALOPRAM HYDROBROMIDE 20 MG/1
40 TABLET ORAL DAILY
Status: DISCONTINUED | OUTPATIENT
Start: 2024-10-25 | End: 2024-10-28 | Stop reason: HOSPADM

## 2024-10-25 RX ORDER — ACETAMINOPHEN 325 MG/1
650 TABLET ORAL EVERY 4 HOURS PRN
Status: DISCONTINUED | OUTPATIENT
Start: 2024-10-25 | End: 2024-10-28 | Stop reason: HOSPADM

## 2024-10-25 RX ORDER — HYDROMORPHONE HCL/PF 1 MG/ML
0.5 SYRINGE (ML) INJECTION EVERY 6 HOURS PRN
Status: DISCONTINUED | OUTPATIENT
Start: 2024-10-25 | End: 2024-10-28 | Stop reason: HOSPADM

## 2024-10-25 RX ADMIN — SODIUM CHLORIDE 75 ML/HR: 0.9 INJECTION, SOLUTION INTRAVENOUS at 02:25

## 2024-10-25 RX ADMIN — ASPIRIN 81 MG 81 MG: 81 TABLET ORAL at 09:03

## 2024-10-25 RX ADMIN — PANTOPRAZOLE SODIUM 40 MG: 40 TABLET, DELAYED RELEASE ORAL at 05:36

## 2024-10-25 RX ADMIN — HEPARIN SODIUM 5000 UNITS: 5000 INJECTION, SOLUTION INTRAVENOUS; SUBCUTANEOUS at 14:44

## 2024-10-25 RX ADMIN — HEPARIN SODIUM 5000 UNITS: 5000 INJECTION, SOLUTION INTRAVENOUS; SUBCUTANEOUS at 02:26

## 2024-10-25 RX ADMIN — ATORVASTATIN CALCIUM 40 MG: 40 TABLET, FILM COATED ORAL at 15:58

## 2024-10-25 RX ADMIN — INSULIN LISPRO 2 UNITS: 100 INJECTION, SOLUTION INTRAVENOUS; SUBCUTANEOUS at 09:04

## 2024-10-25 RX ADMIN — OXYCODONE HYDROCHLORIDE 10 MG: 10 TABLET ORAL at 07:33

## 2024-10-25 RX ADMIN — INSULIN LISPRO 2 UNITS: 100 INJECTION, SOLUTION INTRAVENOUS; SUBCUTANEOUS at 02:26

## 2024-10-25 RX ADMIN — INSULIN LISPRO 4 UNITS: 100 INJECTION, SOLUTION INTRAVENOUS; SUBCUTANEOUS at 15:59

## 2024-10-25 RX ADMIN — OXYCODONE HYDROCHLORIDE 10 MG: 10 TABLET ORAL at 14:45

## 2024-10-25 RX ADMIN — HEPARIN SODIUM 5000 UNITS: 5000 INJECTION, SOLUTION INTRAVENOUS; SUBCUTANEOUS at 21:39

## 2024-10-25 RX ADMIN — SODIUM CHLORIDE 75 ML/HR: 0.9 INJECTION, SOLUTION INTRAVENOUS at 15:58

## 2024-10-25 RX ADMIN — CITALOPRAM HYDROBROMIDE 40 MG: 20 TABLET ORAL at 09:03

## 2024-10-25 RX ADMIN — INSULIN LISPRO 1 UNITS: 100 INJECTION, SOLUTION INTRAVENOUS; SUBCUTANEOUS at 21:39

## 2024-10-25 RX ADMIN — INSULIN LISPRO 2 UNITS: 100 INJECTION, SOLUTION INTRAVENOUS; SUBCUTANEOUS at 11:19

## 2024-10-25 RX ADMIN — METOPROLOL SUCCINATE 25 MG: 25 TABLET, EXTENDED RELEASE ORAL at 09:03

## 2024-10-25 NOTE — ASSESSMENT & PLAN NOTE
Patient with fall 4 days ago  Lives at home alone  Imaging revealed right and left humeral head fractures as well as right middle finger and thumb fractures  Right arm/hand splinted and placed in a sling in the ED  Orthopedic surgery consultation appreciated  Pain regimen as ordered  PT/OT evaluation and treatment  Case management consultation for safe disposition planning  Fall precautions

## 2024-10-25 NOTE — PLAN OF CARE
Problem: PHYSICAL THERAPY ADULT  Goal: Performs mobility at highest level of function for planned discharge setting.  See evaluation for individualized goals.  Description: Treatment/Interventions: Functional transfer training, LE strengthening/ROM, Elevations, Therapeutic exercise, Endurance training, Bed mobility, Gait training          See flowsheet documentation for full assessment, interventions and recommendations.  Note: Prognosis: Good  Problem List: Decreased strength, Decreased endurance, Impaired balance, Decreased mobility, Decreased range of motion, Decreased cognition, Impaired judgement, Decreased safety awareness, Pain, Orthopedic restrictions  Assessment: Patient is a 78 y.o. male evaluated by Physical Therapy s/p admit to [facility] on 10/24/2024 with admitting diagnosis of: Arm pain, Hyponatremia, Metabolic acidosis, Contusion, Left humeral fracture, Right humeral fracture, Syncope and collapse, Fractures involving multiple body regions, Fracture of proximal phalanx of right thumb, Fracture of neck of third metacarpal bone of right hand, and principal problem of: Fractures involving multiple body regions. PT was consulted to assess patient's functional mobility and discharge needs. Ordered are PT Evaluation and treatment with activity level of: up and OOB as tolerated. Comorbidities affecting patient's physical performance at time of assessment include:  DM, HTN, GERD, hx of stroke . Personal factors affecting the patient at time of IE include: lives in multi story home, ambulating with assistive device, step(s) to enter home, inability to navigate community distances, impaired cognition, history of fall(s), impaired safety awareness, limited insight into impairments, inability/difficulty performing IADLs, and inability/difficulty performing ADLs. Please locate objective findings from PT assessment regarding body systems outlined above. Upon evaluation, pt able to perform bed  mobility and functional transfers with SUP-modA x 2, RW, and increased time. Frequent verbal cuing provided for safety awareness and sequencing. Upon standing, pt unable to advance either LE to attempt ambulation, so mobility limited to stand pivot transfer from bed to recliner chair. Pt yelling out in pain at times, however motivated to participate and pleasant. HR and SpO2 remained WFL on RA throughout. The patient's AM-PAC Basic Mobility Inpatient Short Form Raw Score is 12. A Raw score of less than or equal to 16 suggests the patient may benefit from discharge to post-acute rehabilitation services. Please also refer to the recommendation of the Physical Therapist for safe discharge planning. Co treatment with OT secondary to complex medical condition of pt, possible A of 2 required to achieve and maintain transitional movements, requiring the need of skilled therapeutic intervention of 2 therapists to achieve delivery of services. Pt will benefit from continued PT intervention during LOS to address current deficits, increase LOF, and facilitate safe d/c to next level of care when medically appropriate. D/c recommendation at this time is rehabilitation resource intensity level I.        Rehab Resource Intensity Level, PT: I (Maximum Resource Intensity)    See flowsheet documentation for full assessment.

## 2024-10-25 NOTE — ASSESSMENT & PLAN NOTE
"Lab Results   Component Value Date    HGBA1C 12.7 (H) 03/13/2024       No results for input(s): \"POCGLU\" in the last 72 hours.    Blood Sugar Average: Last 72 hrs:    Poorly controlled on outpatient regimen  Sliding scale insulin with Accu-Cheks  Target blood glucose 140-180  Diabetic diet    "

## 2024-10-25 NOTE — PLAN OF CARE
Problem: PAIN - ADULT  Goal: Verbalizes/displays adequate comfort level or baseline comfort level  Description: Interventions:  - Encourage patient to monitor pain and request assistance  - Assess pain using appropriate pain scale  - Administer analgesics based on type and severity of pain and evaluate response  - Implement non-pharmacological measures as appropriate and evaluate response  - Consider cultural and social influences on pain and pain management  - Notify physician/advanced practitioner if interventions unsuccessful or patient reports new pain  Outcome: Progressing     Problem: SAFETY ADULT  Goal: Patient will remain free of falls  Description: INTERVENTIONS:  - Educate patient/family on patient safety including physical limitations  - Instruct patient to call for assistance with activity   - Consult OT/PT to assist with strengthening/mobility   - Keep Call bell within reach  - Keep bed low and locked with side rails adjusted as appropriate  - Keep care items and personal belongings within reach  - Initiate and maintain comfort rounds  - Make Fall Risk Sign visible to staff  - Offer Toileting every 2 Hours, in advance of need  - Initiate/Maintain bed alarm  - Obtain necessary fall risk management equipment: bed alarm  - Apply yellow socks and bracelet for high fall risk patients  - Consider moving patient to room near nurses station  Outcome: Progressing  Goal: Maintain or return to baseline ADL function  Description: INTERVENTIONS:  -  Assess patient's ability to carry out ADLs; assess patient's baseline for ADL function and identify physical deficits which impact ability to perform ADLs (bathing, care of mouth/teeth, toileting, grooming, dressing, etc.)  - Assess/evaluate cause of self-care deficits   - Assess range of motion  - Assess patient's mobility; develop plan if impaired  - Assess patient's need for assistive devices and provide as appropriate  - Encourage maximum independence but intervene  and supervise when necessary  - Involve family in performance of ADLs  - Assess for home care needs following discharge   - Consider OT consult to assist with ADL evaluation and planning for discharge  - Provide patient education as appropriate  Outcome: Progressing  Goal: Maintains/Returns to pre admission functional level  Description: INTERVENTIONS:  - Perform AM-PAC 6 Click Basic Mobility/ Daily Activity assessment daily.  - Set and communicate daily mobility goal to care team and patient/family/caregiver.   - Collaborate with rehabilitation services on mobility goals if consulted  - Perform Range of Motion 3-4 times a day.  - Dangle patient 3 times a day  - Stand patient 3 times a day  - Ambulate patient 3 times a day  - Out of bed to chair 3 times a day   - Out of bed for meals 3 times a day  - Out of bed for toileting  - Record patient progress and toleration of activity level   Outcome: Progressing     Problem: DISCHARGE PLANNING  Goal: Discharge to home or other facility with appropriate resources  Description: INTERVENTIONS:  - Identify barriers to discharge w/patient and caregiver  - Arrange for needed discharge resources and transportation as appropriate  - Identify discharge learning needs (meds, wound care, etc.)  - Arrange for interpretive services to assist at discharge as needed  - Refer to Case Management Department for coordinating discharge planning if the patient needs post-hospital services based on physician/advanced practitioner order or complex needs related to functional status, cognitive ability, or social support system  Outcome: Progressing     Problem: NEUROSENSORY - ADULT  Goal: Achieves maximal functionality and self care  Description: INTERVENTIONS  - Monitor swallowing and airway patency with patient fatigue and changes in neurological status  - Encourage and assist patient to increase activity and self care.   - Encourage visually impaired, hearing impaired and aphasic patients to  use assistive/communication devices  Outcome: Progressing     Problem: METABOLIC, FLUID AND ELECTROLYTES - ADULT  Goal: Electrolytes maintained within normal limits  Description: INTERVENTIONS:  - Monitor labs and assess patient for signs and symptoms of electrolyte imbalances  - Administer electrolyte replacement as ordered  - Monitor response to electrolyte replacements, including repeat lab results as appropriate  - Instruct patient on fluid and nutrition as appropriate  Outcome: Progressing  Goal: Fluid balance maintained  Description: INTERVENTIONS:  - Monitor labs   - Monitor I/O and WT  - Instruct patient on fluid and nutrition as appropriate  - Assess for signs & symptoms of volume excess or deficit  Outcome: Progressing  Goal: Glucose maintained within target range  Description: INTERVENTIONS:  - Monitor Blood Glucose as ordered  - Assess for signs and symptoms of hyperglycemia and hypoglycemia  - Administer ordered medications to maintain glucose within target range  - Assess nutritional intake and initiate nutrition service referral as needed  Outcome: Progressing     Problem: MUSCULOSKELETAL - ADULT  Goal: Maintain or return mobility to safest level of function  Description: INTERVENTIONS:  - Assess patient's ability to carry out ADLs; assess patient's baseline for ADL function and identify physical deficits which impact ability to perform ADLs (bathing, care of mouth/teeth, toileting, grooming, dressing, etc.)  - Assess/evaluate cause of self-care deficits   - Assess range of motion  - Assess patient's mobility  - Assess patient's need for assistive devices and provide as appropriate  - Encourage maximum independence but intervene and supervise when necessary  - Involve family in performance of ADLs  - Assess for home care needs following discharge   - Consider OT consult to assist with ADL evaluation and planning for discharge  - Provide patient education as appropriate  Outcome: Progressing  Goal:  Maintain proper alignment of affected body part  Description: INTERVENTIONS:  - Support, maintain and protect limb and body alignment  - Provide patient/ family with appropriate education  Outcome: Progressing

## 2024-10-25 NOTE — PHYSICAL THERAPY NOTE
Physical Therapy Evaluation    Patient Name: Ian Medina    Today's Date: 10/25/2024     Problem List  Principal Problem:    Fractures involving multiple body regions  Active Problems:    Type 2 diabetes mellitus (HCC)    Primary hypertension    Major depressive disorder    GERD (gastroesophageal reflux disease)    Closed fracture of right proximal humerus    Closed displaced fracture of neck of third metacarpal bone of right hand       Past Medical History  Past Medical History:   Diagnosis Date    Carotid artery embolism, left     Diabetes mellitus (HCC)     Hypertension     Stroke (HCC)         Past Surgical History  Past Surgical History:   Procedure Laterality Date    CATARACT EXTRACTION, BILATERAL             10/25/24 1456   PT Last Visit   PT Visit Date 10/25/24   Note Type   Note type Evaluation   Pain Assessment   Pain Assessment Tool 0-10   Pain Score 10 - Worst Possible Pain   Pain Location/Orientation Orientation: Right;Location: Arm   Restrictions/Precautions   Weight Bearing Precautions Per Order Yes   RUE Weight Bearing Per Order NWB   Braces or Orthoses Sling   Other Precautions Pain;Fall Risk;Multiple lines;Bed Alarm;Chair Alarm   Home Living   Type of Home House   Home Layout Multi-level;Performs ADLs on one level;Able to live on main level with bedroom/bathroom;Stairs to enter with rails  (7 MELIDA c HR)   Bathroom Shower/Tub Walk-in shower   Bathroom Toilet Standard   Bathroom Equipment Toilet raiser;Shower chair;Grab bars in shower   Bathroom Accessibility Accessible   Home Equipment Cane   Additional Comments pt reports use of cane at baseline   Prior Function   Level of [place] Needs assistance with ADLs;Needs assistance with IADLS;Independent with functional mobility   Lives With Other (Comment)  (pt rents a room in a house with other people)   Receives Help From Family;Home health   IADLs Family/Friend/Other provides  "transportation   Falls in the last 6 months >10   Vocational Retired   Comments pt reports son assists with ADLs and has a home health aide M-F to assist with IADLs; unsure of hours   General   Family/Caregiver Present No   Cognition   Overall Cognitive Status Impaired   Arousal/Participation Alert   Orientation Level Oriented to person;Disoriented to place;Disoriented to time;Disoriented to situation   Following Commands Follows one step commands without difficulty   Subjective   Subjective \"My son gives me my showers\"   RLE Assessment   RLE Assessment X  (3/5 grossly)   LLE Assessment   LLE Assessment X  (4-/5 grossly)   Bed Mobility   Supine to Sit 5  Supervision   Additional items HOB elevated;Bedrails;Increased time required;Verbal cues   Sit to Supine   (pt OOB at end of session)   Transfers   Sit to Stand 4  Minimal assistance   Additional items Assist x 2;Increased time required;Verbal cues   Stand to Sit 3  Moderate assistance   Additional items Assist x 2;Increased time required;Verbal cues   Stand pivot 3  Moderate assistance   Additional items Assist x 2;Increased time required;Verbal cues   Additional Comments hand held assist used   Ambulation/Elevation   Gait pattern Not appropriate;Not tested   Balance   Static Sitting Good   Dynamic Sitting Fair +   Static Standing Fair -   Dynamic Standing Poor +   Endurance Deficit   Endurance Deficit Yes   Endurance Deficit Description pt appears easily fatigued with minimal mobility   Activity Tolerance   Activity Tolerance Patient limited by fatigue;Patient limited by pain   Assessment   Prognosis Good   Problem List Decreased strength;Decreased endurance;Impaired balance;Decreased mobility;Decreased range of motion;Decreased cognition;Impaired judgement;Decreased safety awareness;Pain;Orthopedic restrictions   Assessment Patient is a 78 y.o. male evaluated by Physical Therapy s/p admit to [facility] on 10/24/2024 with admitting diagnosis of: Arm " pain, Hyponatremia, Metabolic acidosis, Contusion, Left humeral fracture, Right humeral fracture, Syncope and collapse, Fractures involving multiple body regions, Fracture of proximal phalanx of right thumb, Fracture of neck of third metacarpal bone of right hand, and principal problem of: Fractures involving multiple body regions. PT was consulted to assess patient's functional mobility and discharge needs. Ordered are PT Evaluation and treatment with activity level of: up and OOB as tolerated. Comorbidities affecting patient's physical performance at time of assessment include:  DM, HTN, GERD, hx of stroke . Personal factors affecting the patient at time of IE include: lives in multi story home, ambulating with assistive device, step(s) to enter home, inability to navigate community distances, impaired cognition, history of fall(s), impaired safety awareness, limited insight into impairments, inability/difficulty performing IADLs, and inability/difficulty performing ADLs. Please locate objective findings from PT assessment regarding body systems outlined above. Upon evaluation, pt able to perform bed mobility and functional transfers with SUP-modA x 2, RW, and increased time. Frequent verbal cuing provided for safety awareness and sequencing. Upon standing, pt unable to advance either LE to attempt ambulation, so mobility limited to stand pivot transfer from bed to recliner chair. Pt yelling out in pain at times, however motivated to participate and pleasant. HR and SpO2 remained WFL on RA throughout. The patient's AM-PAC Basic Mobility Inpatient Short Form Raw Score is 12. A Raw score of less than or equal to 16 suggests the patient may benefit from discharge to post-acute rehabilitation services. Please also refer to the recommendation of the Physical Therapist for safe discharge planning. Co treatment with OT secondary to complex medical condition of pt, possible A of 2 required to achieve and maintain  transitional movements, requiring the need of skilled therapeutic intervention of 2 therapists to achieve delivery of services. Pt will benefit from continued PT intervention during LOS to address current deficits, increase LOF, and facilitate safe d/c to next level of care when medically appropriate. D/c recommendation at this time is rehabilitation resource intensity level I.   Goals   Patient Goals to have less pain   LTG Expiration Date 11/08/24   Long Term Goal #1 Pt will participate in B LE strengthening exercises to facilitate improved functional activity tolerance. Pt will perform all functional transfers and bed mobility mod(I) with good safety awareness. Pt will ambulate 250' mod(I) with LRAD while maintaining good functional dynamic balance. Pt will ascend/descend 7 stairs with HR and SUP to reflect the ability to safely navigate the home.   Plan   Treatment/Interventions Functional transfer training;LE strengthening/ROM;Elevations;Therapeutic exercise;Endurance training;Bed mobility;Gait training   PT Frequency 3-5x/wk   Discharge Recommendation   Rehab Resource Intensity Level, PT I (Maximum Resource Intensity)   AM-PAC Basic Mobility Inpatient   Turning in Flat Bed Without Bedrails 3   Lying on Back to Sitting on Edge of Flat Bed Without Bedrails 3   Moving Bed to Chair 2   Standing Up From Chair Using Arms 2   Walk in Room 1   Climb 3-5 Stairs With Railing 1   Basic Mobility Inpatient Raw Score 12   Basic Mobility Standardized Score 32.23   [facility] Highest Level Of Mobility   -[facility] Goal 4: Move to chair/commode   -HLM Achieved 4: Move to chair/commode   End of Consult   Patient Position at End of Consult Bedside chair;Bed/Chair alarm activated;All needs within reach

## 2024-10-25 NOTE — ASSESSMENT & PLAN NOTE
Blood pressures appear somewhat soft for patient's age  Hold amlodipine-benazepril  Continue home beta-blocker  Monitor blood pressures

## 2024-10-25 NOTE — PLAN OF CARE
Problem: PAIN - ADULT  Goal: Verbalizes/displays adequate comfort level or baseline comfort level  Description: Interventions:  - Encourage patient to monitor pain and request assistance  - Assess pain using appropriate pain scale  - Administer analgesics based on type and severity of pain and evaluate response  - Implement non-pharmacological measures as appropriate and evaluate response  - Consider cultural and social influences on pain and pain management  - Notify physician/advanced practitioner if interventions unsuccessful or patient reports new pain  10/25/2024 0726 by Sofia Mccoy RN  Outcome: Progressing  10/25/2024 0726 by Sofia Mccoy RN  Outcome: Progressing     Problem: SAFETY ADULT  Goal: Patient will remain free of falls  Description: INTERVENTIONS:  - Educate patient/family on patient safety including physical limitations  - Instruct patient to call for assistance with activity   - Consult OT/PT to assist with strengthening/mobility   - Keep Call bell within reach  - Keep bed low and locked with side rails adjusted as appropriate  - Keep care items and personal belongings within reach  - Initiate and maintain comfort rounds  - Make Fall Risk Sign visible to staff  - Offer Toileting every 2 Hours, in advance of need  - Initiate/Maintain bed/chair alarm  - Obtain necessary fall risk management equipment: alarms  - Apply yellow socks and bracelet for high fall risk patients  - Consider moving patient to room near nurses station  10/25/2024 0726 by Sofia Mccoy RN  Outcome: Progressing  10/25/2024 0726 by Sofia Mccoy RN  Outcome: Progressing  Goal: Maintain or return to baseline ADL function  Description: INTERVENTIONS:  -  Assess patient's ability to carry out ADLs; assess patient's baseline for ADL function and identify physical deficits which impact ability to perform ADLs (bathing, care of mouth/teeth, toileting, grooming, dressing, etc.)  - Assess/evaluate cause of self-care deficits   -  Assess range of motion  - Assess patient's mobility; develop plan if impaired  - Assess patient's need for assistive devices and provide as appropriate  - Encourage maximum independence but intervene and supervise when necessary  - Involve family in performance of ADLs  - Assess for home care needs following discharge   - Consider OT consult to assist with ADL evaluation and planning for discharge  - Provide patient education as appropriate  10/25/2024 0726 by Sofia Mccoy RN  Outcome: Progressing  10/25/2024 0726 by Sofia Mccoy RN  Outcome: Progressing  Goal: Maintains/Returns to pre admission functional level  Description: INTERVENTIONS:  - Perform AM-PAC 6 Click Basic Mobility/ Daily Activity assessment daily.  - Set and communicate daily mobility goal to care team and patient/family/caregiver.   - Collaborate with rehabilitation services on mobility goals if consulted  - Perform Range of Motion 3 times a day.  - Reposition patient every 3 hours.  - Dangle patient 3 times a day  - Stand patient 3 times a day  - Ambulate patient 3 times a day  - Out of bed to chair 3 times a day   - Out of bed for meals 3 times a day  - Out of bed for toileting  - Record patient progress and toleration of activity level   10/25/2024 0726 by Sofia Mccoy RN  Outcome: Progressing  10/25/2024 0726 by Sofia Mccoy RN  Outcome: Progressing     Problem: DISCHARGE PLANNING  Goal: Discharge to home or other facility with appropriate resources  Description: INTERVENTIONS:  - Identify barriers to discharge w/patient and caregiver  - Arrange for needed discharge resources and transportation as appropriate  - Identify discharge learning needs (meds, wound care, etc.)  - Arrange for interpretive services to assist at discharge as needed  - Refer to Case Management Department for coordinating discharge planning if the patient needs post-hospital services based on physician/advanced practitioner order or complex needs related to  functional status, cognitive ability, or social support system  10/25/2024 0726 by Sofia Mccoy RN  Outcome: Progressing  10/25/2024 0726 by Sofia Mccoy RN  Outcome: Progressing     Problem: NEUROSENSORY - ADULT  Goal: Achieves maximal functionality and self care  Description: INTERVENTIONS  - Monitor swallowing and airway patency with patient fatigue and changes in neurological status  - Encourage and assist patient to increase activity and self care.   - Encourage visually impaired, hearing impaired and aphasic patients to use assistive/communication devices  10/25/2024 0726 by Sofia Mccoy RN  Outcome: Progressing  10/25/2024 0726 by Sofia Mccoy RN  Outcome: Progressing     Problem: METABOLIC, FLUID AND ELECTROLYTES - ADULT  Goal: Electrolytes maintained within normal limits  Description: INTERVENTIONS:  - Monitor labs and assess patient for signs and symptoms of electrolyte imbalances  - Administer electrolyte replacement as ordered  - Monitor response to electrolyte replacements, including repeat lab results as appropriate  - Instruct patient on fluid and nutrition as appropriate  10/25/2024 0726 by Sofia Mccoy RN  Outcome: Progressing  10/25/2024 0726 by Sofia Mccoy RN  Outcome: Progressing  Goal: Fluid balance maintained  Description: INTERVENTIONS:  - Monitor labs   - Monitor I/O and WT  - Instruct patient on fluid and nutrition as appropriate  - Assess for signs & symptoms of volume excess or deficit  10/25/2024 0726 by Sofia Mccoy RN  Outcome: Progressing  10/25/2024 0726 by Sofia Mccoy RN  Outcome: Progressing  Goal: Glucose maintained within target range  Description: INTERVENTIONS:  - Monitor Blood Glucose as ordered  - Assess for signs and symptoms of hyperglycemia and hypoglycemia  - Administer ordered medications to maintain glucose within target range  - Assess nutritional intake and initiate nutrition service referral as needed  10/25/2024 0726 by Sofia Mccoy RN  Outcome:  Progressing  10/25/2024 0726 by Sofia Mccoy RN  Outcome: Progressing     Problem: MUSCULOSKELETAL - ADULT  Goal: Maintain or return mobility to safest level of function  Description: INTERVENTIONS:  - Assess patient's ability to carry out ADLs; assess patient's baseline for ADL function and identify physical deficits which impact ability to perform ADLs (bathing, care of mouth/teeth, toileting, grooming, dressing, etc.)  - Assess/evaluate cause of self-care deficits   - Assess range of motion  - Assess patient's mobility  - Assess patient's need for assistive devices and provide as appropriate  - Encourage maximum independence but intervene and supervise when necessary  - Involve family in performance of ADLs  - Assess for home care needs following discharge   - Consider OT consult to assist with ADL evaluation and planning for discharge  - Provide patient education as appropriate  10/25/2024 0726 by Sofia Mccoy RN  Outcome: Progressing  10/25/2024 0726 by Sofia Mccoy RN  Outcome: Progressing  Goal: Maintain proper alignment of affected body part  Description: INTERVENTIONS:  - Support, maintain and protect limb and body alignment  - Provide patient/ family with appropriate education  10/25/2024 0726 by Sofia Mccoy RN  Outcome: Progressing  10/25/2024 0726 by Sofia Mccoy RN  Outcome: Progressing     Problem: Prexisting or High Potential for Compromised Skin Integrity  Goal: Skin integrity is maintained or improved  Description: INTERVENTIONS:  - Identify patients at risk for skin breakdown  - Assess and monitor skin integrity  - Assess and monitor nutrition and hydration status  - Monitor labs   - Assess for incontinence   - Turn and reposition patient  - Assist with mobility/ambulation  - Relieve pressure over bony prominences  - Avoid friction and shearing  - Provide appropriate hygiene as needed including keeping skin clean and dry  - Evaluate need for skin moisturizer/barrier cream  - Collaborate  with interdisciplinary team   - Patient/family teaching  - Consider wound care consult   10/25/2024 0726 by Sofia Mccoy RN  Outcome: Progressing  10/25/2024 0726 by Sofia Mccoy RN  Outcome: Progressing

## 2024-10-25 NOTE — H&P
"H&P - Hospitalist   Name: Ian Medina 78 y.o. male I MRN: [id number]  Unit/Bed#: RM02 I Date of Admission: 10/24/2024   Date of Service: 10/25/2024 I Hospital Day: 0     Assessment & Plan  Fractures involving multiple body regions  Patient with fall 4 days ago  Lives at home alone  Imaging revealed right and left humeral head fractures as well as right middle finger and thumb fractures  Right arm/hand splinted and placed in a sling in the ED  Orthopedic surgery consultation appreciated  Pain regimen as ordered  PT/OT evaluation and treatment  Case management consultation for safe disposition planning  Fall precautions  Type 2 diabetes mellitus (HCC)  Lab Results   Component Value Date    HGBA1C 12.7 (H) 03/13/2024       No results for input(s): \"POCGLU\" in the last 72 hours.    Blood Sugar Average: Last 72 hrs:    Poorly controlled on outpatient regimen  Sliding scale insulin with Accu-Cheks  Target blood glucose 140-180  Diabetic diet    Primary hypertension  Blood pressures appear somewhat soft for patient's age  Hold amlodipine-benazepril  Continue home beta-blocker  Monitor blood pressures  Major depressive disorder  Mood appears appropriate  Continue home Celexa  GERD (gastroesophageal reflux disease)  Stable and chronic in nature  Continue home PPI    VTE Pharmacologic Prophylaxis: VTE Score: 8 High Risk (Score >/= 5) - Pharmacological DVT Prophylaxis Ordered: heparin. Sequential Compression Devices Ordered.  Code Status: Level 1 - Full Code  Discussion with family: Updated  (son) at bedside.    Anticipated Length of Stay: Patient will be admitted on an inpatient basis with an anticipated length of stay of greater than 2 midnights secondary to fall.    History of Present Illness   Chief Complaint: Fall    Ian Medina is a 78 y.o. male with a PMH of hypertension, GERD, depression, type 2 diabetes mellitus who presents with fall.  The patient lives at home alone and states that he fell " 4 days ago.  The patient and son state that the patient has been having difficulty with ADLs and taking care of himself at home.  In the ED, all vital signs are found to be stable.  Laboratory analysis unremarkable.  Imaging reveals right and left humeral head fractures.  Orthopedic surgery will be consulted and the patient will likely need placement to a skilled nursing facility.    Review of Systems   Constitutional:  Negative for chills and fever.   HENT:  Negative for ear pain and sore throat.    Eyes:  Negative for pain and visual disturbance.   Respiratory:  Negative for cough and shortness of breath.    Cardiovascular:  Negative for chest pain and palpitations.   Gastrointestinal:  Negative for abdominal pain and vomiting.   Genitourinary:  Negative for dysuria and hematuria.   Musculoskeletal:  Negative for arthralgias and back pain.   Skin:  Negative for color change and rash.   Neurological:  Negative for seizures and syncope.   All other systems reviewed and are negative.      Historical Information   Past Medical History:   Diagnosis Date    Carotid artery embolism, left     Diabetes mellitus (HCC)     Hypertension     Stroke (HCC)      Past Surgical History:   Procedure Laterality Date    CATARACT EXTRACTION, BILATERAL       Social History     Tobacco Use    Smoking status: Never    Smokeless tobacco: Never   Vaping Use    Vaping status: Never Used   Substance and Sexual Activity    Alcohol use: Not Currently     Comment: occasional    Drug use: Never    Sexual activity: Not on file     E-Cigarette/Vaping    E-Cigarette Use Never User      E-Cigarette/Vaping Substances     Meds/Allergies   I have reviewed home medications using recent Epic encounter.  Prior to Admission medications    Medication Sig Start Date End Date Taking? Authorizing Provider   citalopram (CeleXA) 40 mg tablet Take 40 mg by mouth daily 7/1/24  Yes Historical Provider, MD   Jardiance 10 MG TABS tablet Take 1 tablet by mouth in the  morning 10/13/24  Yes Historical Provider, MD   metoprolol succinate (TOPROL-XL) 25 mg 24 hr tablet Take 25 mg by mouth daily 10/21/24  Yes Historical Provider, MD   omeprazole (PriLOSEC) 20 mg delayed release capsule Take 20 mg by mouth daily 8/16/24  Yes Historical Provider, MD   Ozempic, 0.25 or 0.5 MG/DOSE, 2 MG/3ML injection pen Inject 0.25 mg once weekly for 4 weeks, then increase to 0.5 mg weekly thereafter 8/28/24  Yes Historical Provider, MD   amLODIPine-benazepril (LOTREL 5-20) 5-20 MG per capsule Take 1 capsule by mouth daily    Historical Provider, MD   Aspirin-Calcium Carbonate  MG TABS Take 1 tablet by mouth daily    Historical Provider, MD   atorvastatin (LIPITOR) 40 mg tablet Take 40 mg by mouth every morning    Historical Provider, MD   Cholecalciferol (Vitamin D3) 50 MCG (2000 UT) capsule Take 2,000 Units by mouth every morning    Historical Provider, MD   Evolocumab with Infusor 420 MG/3.5ML SOCT Inject 420 mg under the skin    Historical Provider, MD     No Known Allergies    Objective :  Temp:  [97.5 °F (36.4 °C)-97.6 °F (36.4 °C)] 97.5 °F (36.4 °C)  HR:  [77-87] 77  BP: (103-159)/(52-65) 111/56  Resp:  [16-20] 16  SpO2:  [94 %-100 %] 95 %  O2 Device: None (Room air)    Physical Exam  Vitals and nursing note reviewed.   Constitutional:       General: He is not in acute distress.     Appearance: He is well-developed.   HENT:      Head: Normocephalic and atraumatic.   Eyes:      Conjunctiva/sclera: Conjunctivae normal.   Cardiovascular:      Rate and Rhythm: Normal rate and regular rhythm.      Heart sounds: No murmur heard.  Pulmonary:      Effort: Pulmonary effort is normal. No respiratory distress.      Breath sounds: Normal breath sounds.   Abdominal:      Palpations: Abdomen is soft.      Tenderness: There is no abdominal tenderness.   Musculoskeletal:         General: No swelling.      Cervical back: Neck supple.   Skin:     General: Skin is warm and dry.      Capillary Refill:  Capillary refill takes less than 2 seconds.   Neurological:      Mental Status: He is alert.   Psychiatric:         Mood and Affect: Mood normal.          Lab Results: I have reviewed the following results:  Results from last 7 days   Lab Units 10/24/24  2111   WBC Thousand/uL 7.73   HEMOGLOBIN g/dL 13.7   HEMATOCRIT % 41.0   PLATELETS Thousands/uL 243   SEGS PCT % 59   LYMPHO PCT % 27   MONO PCT % 10   EOS PCT % 2     Results from last 7 days   Lab Units 10/24/24  2111   SODIUM mmol/L 134*   POTASSIUM mmol/L 4.9   CHLORIDE mmol/L 100   CO2 mmol/L 19*   BUN mg/dL 35*   CREATININE mg/dL 1.48*   ANION GAP mmol/L 15*   CALCIUM mg/dL 9.8   ALBUMIN g/dL 4.1   TOTAL BILIRUBIN mg/dL 0.98   ALK PHOS U/L 80   ALT U/L 17   AST U/L 18   GLUCOSE RANDOM mg/dL 274*     Results from last 7 days   Lab Units 10/24/24  2111   INR  1.05         Lab Results   Component Value Date    HGBA1C 12.7 (H) 03/13/2024    HGBA1C 10.4 (H) 12/05/2023    HGBA1C 11.6 (H) 09/05/2023           Imaging Results Review: No pertinent imaging studies reviewed.  Other Study Results Review: No additional pertinent studies reviewed.    Administrative Statements   I have spent a total time of 75 minutes in caring for this patient on the day of the visit/encounter including Diagnostic results, Prognosis, Risks and benefits of tx options, Instructions for management, Patient and family education, Importance of tx compliance, Risk factor reductions, Impressions, Counseling / Coordination of care, Documenting in the medical record, Reviewing / ordering tests, medicine, procedures  , Obtaining or reviewing history  , and Communicating with other healthcare professionals .    ** Please Note: This note has been constructed using a voice recognition system. **

## 2024-10-25 NOTE — ASSESSMENT & PLAN NOTE
Ian is a 78-year-old male with a right proximal humerus fracture and right metacarpal neck fracture after a fall sustained a few days ago.  Both injuries can be treated without surgery.  Regarding the proximal humerus fracture I recommend sling for comfort and no lifting with the right upper extremity.  He can do gentle pendulum exercises with occupational therapy while admitted.  Regarding his right metacarpal fracture he is currently in a splint that was placed by the ER, I recommend he continue the splint and no lifting with that hand.  He can do elbow range of motion as tolerated.  He should see me in the office for outpatient follow-up in about 2 weeks to start physical therapy and get repeat radiographs.  As for his hand fracture I recommend he see Dr. Jesus Mehta for further management.  All questions were answered and plan was relayed to the primary team.

## 2024-10-25 NOTE — ED PROCEDURE NOTE
PROCEDURE  Splint application    Date/Time: 10/25/2024 1:44 AM    Performed by: Lucian Leblanc MD  Authorized by: Lucian Leblanc MD  Universal Protocol:  Procedure performed by:  Consent: Verbal consent obtained.  Risks and benefits: risks, benefits and alternatives were discussed  Consent given by: patient  Patient understanding: patient states understanding of the procedure being performed  Radiology Images displayed and confirmed. If images not available, report reviewed: imaging studies available  Patient identity confirmed: verbally with patient    Pre-procedure details:     Sensation:  Normal  Procedure details:     Laterality:  Right    Location:  Hand    Hand:  R handCast type:  Short arm        Splint type:  Radial gutter and thumb spica    Supplies:  Cotton padding, 2 layer wrap, Ortho-Glass and sling  Post-procedure details:     Pain:  Unchanged    Sensation:  Normal    Patient tolerance of procedure:  Tolerated well, no immediate complications       Lucian Leblanc MD  10/25/24 0145

## 2024-10-25 NOTE — PLAN OF CARE
Problem: OCCUPATIONAL THERAPY ADULT  Goal: Performs self-care activities at highest level of function for planned discharge setting.  See evaluation for individualized goals.  Description: Treatment Interventions: ADL retraining, Functional transfer training, UE strengthening/ROM, Endurance training, Cognitive reorientation, Patient/family training, Equipment evaluation/education, Activityengagement, Compensatory technique education          See flowsheet documentation for full assessment, interventions and recommendations.   Note: Limitation: Decreased ADL status, Decreased UE strength, Decreased UE ROM, Decreased Safe judgement during ADL, Decreased cognition, Decreased endurance, Decreased self-care trans, Decreased high-level ADLs, Decreased fine motor control     Assessment: Pt is a 78 y.o. male seen for OT evaluation s/p admit to [facility] on 10/24/2024 w/ Fractures involving multiple body regions.  Comorbidities affecting pt's functional performance at time of assessment include:  fractures involving multiple body regions, DM, HTN, MDD, GERD, closed fracture of R proximal humerus, closed displaced fracture of neck of third metacarpal of R hand, CVA, carotid artery embolism  . Personal factors affecting pt at time of IE include:steps to enter environment, limited home support, behavioral pattern, difficulty performing ADLS, difficulty performing IADLS , level of education, limited insight into deficits, compliance, decreased initiation and engagement , and health management . Prior to admission, pt was (A) with ADLs and IADLs with use of no device during mobility. Upon evaluation: Pt requires (S)-max (A) with use of handheld (A) x2  2* the following deficits impacting occupational performance: weakness, decreased ROM, decreased strength, decreased balance, decreased tolerance, impaired GMC, impaired FMC, impaired initiation, impaired memory, impaired sequencing, impaired problem solving, impulsivity, decreased  safety awareness, increased pain, and orthopedic restrictions. Pt to benefit from continued skilled OT tx while in the hospital to address deficits as defined above and maximize level of functional independence w ADL's and functional mobility. Occupational Performance areas to address include: grooming, bathing/shower, toilet hygiene, dressing, functional mobility, community mobility, and clothing management. The patient's raw score on the -PAC Daily Activity Inpatient Short Form is 14. A raw score of less than 19 suggests the patient may benefit from discharge to post-acute rehabilitation services. Discharge recommendation at this time is level I maximum resource intensity.  Pt benefited from co-evaluation of skilled OT and PT therapists in order to most appropriately address functional deficits d/t extensive assistance required for safe functional mobility, decreased activity tolerance, and regression from functioning level prior to admission and/or onset of present illness. OT/PT objectives were addressed separately; please see PT note for specific goal areas targeted.     Rehab Resource Intensity Level, OT: I (Maximum Resource Intensity)

## 2024-10-25 NOTE — CASE MANAGEMENT
Case Management Assessment & Discharge Planning Note    Patient name Ian Medina  Location /426-01 MRN [id number]  : 1945 Date 10/25/2024       Current Admission Date: 10/24/2024  Current Admission Diagnosis:Fractures involving multiple body regions   Patient Active Problem List    Diagnosis Date Noted Date Diagnosed    Fractures involving multiple body regions 10/25/2024     Type 2 diabetes mellitus (HCC) 10/25/2024     Primary hypertension 10/25/2024     Major depressive disorder 10/25/2024     GERD (gastroesophageal reflux disease) 10/25/2024     Closed fracture of right proximal humerus 10/25/2024     Closed displaced fracture of neck of third metacarpal bone of right hand 10/25/2024       LOS (days): 0  Geometric Mean LOS (GMLOS) (days): 2.6  Days to GMLOS:2     OBJECTIVE:    Risk of Unplanned Readmission Score: 14.76         Current admission status: Inpatient  Referral Reason:  (discharge planning)    Preferred Pharmacy:   [facility] [address] [place] [id number]  Phone: [phone] Fax: [phone]    Primary Care Provider: No primary care provider on file.    Primary Insurance: MEDICARE  Secondary Insurance: [facility]    ASSESSMENT:    CM met with patient at the bedside,baseline information  was obtained. CM discussed the role of CM in helping the patient develop a discharge plan and assist the patient in carry out their plan.    Patient lives alone. Son assist and has aides daily am and pm.    Baseline assist with adl's walker and cane at home son stated patient has not been using them.        Active Health Care Proxies    There are no active Health Care Proxies on file.                 Readmission Root Cause  30 Day Readmission: No    Patient Information  Admitted from:: Home  Mental Status: Alert  During Assessment patient was accompanied by: Son  Assessment information provided by:: Son  Primary Caregiver:  Family  Caregiver's Name:: Anton bernard  Caregiver's Relationship to Patient:: Family Member  Caregiver's Telephone Number:: [phone]  Support Systems: Self  County of Residence: [facility]  What city do you live in?: [place]  Home entry access options. Select all that apply.: Stairs  Number of steps to enter home.: 5  Do the steps have railings?: Yes  Type of Current Residence: [address] [place] home  Upon entering residence, is there a bedroom on the main floor (no further steps)?: Yes  Upon entering residence, is there a bathroom on the main floor (no further steps)?: Yes  Living Arrangements: Lives Alone (Son assist daily has aides am and pm)  Is patient a ?: No    Activities of Daily Living Prior to Admission  Functional Status: Assistance  Completes ADLs independently?: Yes  Ambulates independently?: Yes  Does patient use assisted devices?: Yes  Assisted Devices (DME) used: Walker, Straight Cane  Does patient currently own DME?: Yes  What DME does the patient currently own?: Straight Cane, Walker  Does patient have a history of Outpatient Therapy (PT/OT)?: Yes ([facility])  Does the patient have a history of Short-Term Rehab?: No  Does patient have a history of HHC?: No  Does patient currently have HHC?: No         Patient Information Continued  Income Source: SSI/SSD  Does patient have prescription coverage?: Yes  Does patient receive dialysis treatments?: No  Does patient have a history of substance abuse?: No  Does patient have a history of Mental Health Diagnosis?: No         Means of Transportation  Means of Transport to Appts:: Family transport          DISCHARGE DETAILS:    Discharge planning discussed with:: patient      Contacts  Patient Contacts: Anton Bernard  Relationship to Patient:: Family  Contact Method: Phone  Phone Number: [phone]  Reason/Outcome: Discharge Planning    Requested Home Health Care         Is the patient interested in HHC at discharge?:  No    DME Referral Provided  Referral made for DME?: No    Would you like to participate in our [facility] service program?  : No - Declined           CM discussed  FOC with patient caretaker at bedside/phone. Patient  caretaker agreeable with blanket referrals in [facility] for local facilities to determine bed availability according to your medical needs and insurance coverage. Patient caretaker has no preference         Referral placed in aidin for STR.    Cm to follow for discharge needs.

## 2024-10-25 NOTE — QUICK NOTE
Evaluated the patient today. Spoke with orthopedics, nonoperative management. Can resume a diet, utilize diabetic diet. To continue use of sling. Awaiting PT/OT input. I did briefly discuss rehab with patient and he tells me he prefers to go home with his son who assists with taking care of him.     Overall, patient is stable and appears well. Will discuss rehab with patient and family once PT/OT has evaluated.

## 2024-10-25 NOTE — ED PROVIDER NOTES
Time reflects when diagnosis was documented in both MDM as applicable and the Disposition within this note       Time User Action Codes Description Comment    10/25/2024 12:37 AM Donato Montenegro [W19.XXXA] Fall, initial encounter     10/25/2024 12:37 AM Donato Montenegro Add [S42.301A] Right humeral fracture     10/25/2024 12:37 AM Donato Montenegro Add [S42.302A] Left humeral fracture     10/25/2024 12:38 AM Donato Montenegro Add [S62.332A] Fracture of neck of third metacarpal bone of right hand     10/25/2024 12:39 AM Donato Montenegro Add [S62.511A] Fracture of proximal phalanx of right thumb     10/25/2024 12:39 AM Donato Montenegro Add [T14.8XXA] Contusion     10/25/2024 12:40 AM Donato Montenegro [E87.1] Hyponatremia     10/25/2024 12:40 AM Donato Montenegro [E87.20] Metabolic acidosis     10/25/2024 12:40 AM Kartik Mazariegos Modify [W19.XXXA] Fall, initial encounter     10/25/2024 12:40 AM Kartik Mazariegos Add [T07.XXXA] Fractures involving multiple body regions           ED Disposition       ED Disposition   Admit    Condition   Stable    Date/Time   Fri Oct 25, 2024 12:37 AM    Comment   Case was discussed with MILAN and the patient's admission status was agreed to be Admission Status: inpatient status to the service of Dr. Mazariegos .               Assessment & Plan       Medical Decision Making  78-year-old male presents for evaluation after a fall that occurred approximately 4 days ago.  Patient with chief complaint of pain to the right arm and patient presents in a sling and a soft brace to the right wrist that was placed by his son.  Patient with prior history of CVA with some residual weakness on the right side.  Note small areas of ecchymosis to the right face.  Patient denying any neck pain, chest pain, shortness of breath, nausea or vomiting, dizziness or diaphoresis.  Concern for multiple falls.  Patient does live alone and concerned that unable to care for himself adequately.  On exam, the patient  is in some pain but is not toxic or ill-appearing.    Basic labs, EKG, symptomatic turn, CT head and cervical spine, CT chest/abdomen/pelvis, splinting as appropriate, disposition as appropriate.    Amount and/or Complexity of Data Reviewed  Labs: ordered. Decision-making details documented in ED Course.  Radiology: ordered and independent interpretation performed. Decision-making details documented in ED Course.  ECG/medicine tests: ordered and independent interpretation performed. Decision-making details documented in ED Course.    Risk  Prescription drug management.  Decision regarding hospitalization.             Medications   aspirin chewable tablet 81 mg (has no administration in time range)   atorvastatin (LIPITOR) tablet 40 mg (has no administration in time range)   citalopram (CeleXA) tablet 40 mg (has no administration in time range)   metoprolol succinate (TOPROL-XL) 24 hr tablet 25 mg (has no administration in time range)   pantoprazole (PROTONIX) EC tablet 40 mg (has no administration in time range)   acetaminophen (TYLENOL) tablet 650 mg (has no administration in time range)   ondansetron (ZOFRAN) injection 4 mg (has no administration in time range)   sodium chloride 0.9 % infusion (has no administration in time range)   heparin (porcine) subcutaneous injection 5,000 Units (has no administration in time range)   oxyCODONE (ROXICODONE) IR tablet 5 mg (has no administration in time range)   oxyCODONE (ROXICODONE) immediate release tablet 10 mg (has no administration in time range)   HYDROmorphone (DILAUDID) injection 0.5 mg (has no administration in time range)   insulin lispro (HumALOG/ADMELOG) 100 units/mL subcutaneous injection 1-6 Units (has no administration in time range)   insulin lispro (HumALOG/ADMELOG) 100 units/mL subcutaneous injection 1-6 Units (has no administration in time range)   fentaNYL injection 50 mcg (50 mcg Intravenous Given 10/24/24 2127)   iohexol (OMNIPAQUE) 350 MG/ML injection  (MULTI-DOSE) 100 mL (100 mL Intravenous Given 10/24/24 2214)   Ketamine HCl 10 mg (10 mg Intravenous Given 10/24/24 2159)   magnesium sulfate 2 g/50 mL IVPB (premix) 2 g (0 g Intravenous Stopped 10/24/24 2357)   HYDROmorphone (DILAUDID) injection 0.5 mg (0.5 mg Intravenous Given 10/24/24 2351)       ED Risk Strat Scores                           SBIRT 20yo+      Flowsheet Row Most Recent Value   Initial Alcohol Screen: US AUDIT-C     1. How often do you have a drink containing alcohol? 0 Filed at: 10/24/2024 2059   2. How many drinks containing alcohol do you have on a typical day you are drinking?  0 Filed at: 10/24/2024 2059   3a. Male UNDER 65: How often do you have five or more drinks on one occasion? 0 Filed at: 10/24/2024 2059   3b. FEMALE Any Age, or MALE 65+: How often do you have 4 or more drinks on one occassion? 0 Filed at: 10/24/2024 2059   Audit-C Score 0 Filed at: 10/24/2024 2059   ANGELA: How many times in the past year have you...    Used an illegal drug or used a prescription medication for non-medical reasons? Never Filed at: 10/24/2024 2059                            History of Present Illness       Chief Complaint   Patient presents with    Fall     Patient presents to ED from home w/son w/c/o fall on Sunday, Patient fell down a last carpeted step onto R side - Hx of stroke w/weakness of R side, R shoulder and RUE pain, patient has bruising to R side as well however does not c/o pain no crepitus felt lungs CTA, +headstrike - R eye bruising, patient on low dose aspirin daily, -LOC, patient reports slip and fall - no dizziness prior to fall       Past Medical History:   Diagnosis Date    Carotid artery embolism, left     Diabetes mellitus (HCC)     Hypertension     Stroke (HCC)       Past Surgical History:   Procedure Laterality Date    CATARACT EXTRACTION, BILATERAL        History reviewed. No pertinent family history.   Social History     Tobacco Use    Smoking status: Former     Types: Cigarettes     Smokeless tobacco: Never   Vaping Use    Vaping status: Never Used   Substance Use Topics    Alcohol use: Not Currently     Comment: occasional    Drug use: Never      E-Cigarette/Vaping    E-Cigarette Use Never User       E-Cigarette/Vaping Substances      I have reviewed and agree with the history as documented.     78-year-old male presents for evaluation after a fall that occurred approximately 4 days ago.  Patient with chief complaint of pain to the right arm and patient presents in a sling and a soft brace to the right wrist that was placed by his son.  Patient with prior history of CVA with some residual weakness on the right side.  Note small areas of ecchymosis to the right face.  Patient denying any neck pain, chest pain, shortness of breath, nausea or vomiting, dizziness or diaphoresis.  Concern for multiple falls.  Patient does live alone and concerned that unable to care for himself adequately.  On exam, the patient is in some pain but is not toxic or ill-appearing.          Review of Systems   Constitutional:  Negative for activity change, appetite change, chills, diaphoresis, fatigue and fever.   HENT:  Negative for dental problem, ear pain, sore throat, trouble swallowing and voice change.    Eyes:  Negative for pain and visual disturbance.   Respiratory:  Negative for cough, chest tightness, shortness of breath and wheezing.    Cardiovascular:  Negative for chest pain, palpitations and leg swelling.   Gastrointestinal:  Negative for abdominal pain, anal bleeding, blood in stool, diarrhea, nausea, rectal pain and vomiting.   Endocrine: Negative for polydipsia, polyphagia and polyuria.   Genitourinary:  Negative for difficulty urinating, dysuria, flank pain, frequency, hematuria and urgency.   Musculoskeletal:  Negative for back pain, joint swelling, myalgias, neck pain and neck stiffness.        Rue pain   Skin:  Negative for pallor, rash and wound.   Neurological:  Negative for dizziness, facial  asymmetry, speech difficulty, weakness, light-headedness, numbness and headaches.   Hematological:  Negative for adenopathy.   Psychiatric/Behavioral:  Negative for agitation. The patient is not nervous/anxious.    All other systems reviewed and are negative.          Objective       ED Triage Vitals   Temperature Pulse Blood Pressure Respirations SpO2 Patient Position - Orthostatic VS   10/24/24 2002 10/24/24 2002 10/24/24 2002 10/24/24 2002 10/24/24 2002 10/24/24 2130   97.6 °F (36.4 °C) 87 103/55 20 100 % Sitting      Temp Source Heart Rate Source BP Location FiO2 (%) Pain Score    10/25/24 0000 10/24/24 2030 10/24/24 2130 -- 10/24/24 2002    Temporal Monitor Left arm  10 - Worst Possible Pain      Vitals      Date and Time Temp Pulse SpO2 Resp BP Pain Score FACES Pain Rating User   10/25/24 1456 -- -- -- -- -- 10 - Worst Possible Pain -- MEF   10/25/24 1454 -- -- -- -- -- 10 - Worst Possible Pain -- JR   10/25/24 1445 -- -- -- -- -- 10 - Worst Possible Pain -- HL   10/25/24 1412 98.5 °F (36.9 °C) -- 99 % -- 112/60 -- -- DII   10/25/24 1100 -- -- -- -- -- 6 -- AP   10/25/24 0750 -- -- -- -- -- 8 -- TLR   10/25/24 0733 -- -- -- -- -- 8 -- TLR   10/25/24 0711 97.4 °F (36.3 °C) 78 95 % -- 114/61 -- -- DII   10/25/24 0710 -- 70 95 % -- 114/61 -- -- DII   10/25/24 0618 -- 70 91 % -- -- -- -- MK   10/25/24 0600 -- -- 93 % -- -- -- -- MK   10/25/24 0155 -- -- 96 % -- -- No Pain -- MK   10/25/24 0152 97.4 °F (36.3 °C) -- -- -- -- -- -- KMK   10/25/24 0152 -- 78 95 % 20 111/59 -- -- DII   10/25/24 0101 -- -- -- -- -- 6 -- LC   10/25/24 0100 -- 75 93 % 20 124/61 6 -- LC   10/25/24 0000 97.5 °F (36.4 °C) 77 95 % 16 111/56 8 -- LC   10/24/24 2300 -- 78 94 % 18 118/52 2 -- LC   10/24/24 2230 -- 77 100 % 18 159/65 -- -- CD   10/24/24 2130 -- 79 100 % 20 122/62 -- -- CD   10/24/24 2127 -- -- -- -- -- 10 - Worst Possible Pain -- CD   10/24/24 2030 -- 86 99 % 20 111/58 -- -- CD   10/24/24 2002 97.6 °F (36.4 °C) 87 100 % 20 103/55  10 - Worst Possible Pain -- LC            Physical Exam  Vitals and nursing note reviewed.   Constitutional:       General: He is not in acute distress.     Appearance: He is well-developed. He is not ill-appearing, toxic-appearing or diaphoretic.   HENT:      Head: Normocephalic. Contusion present. No raccoon eyes, Staples's sign, abrasion, masses, right periorbital erythema, left periorbital erythema or laceration. Hair is normal.      Jaw: There is normal jaw occlusion. No trismus, tenderness, swelling or pain on movement.        Right Ear: External ear normal.      Left Ear: External ear normal.      Nose: Nose normal. No nasal deformity, septal deviation, signs of injury, nasal tenderness, congestion or rhinorrhea.      Right Nostril: No epistaxis or septal hematoma.      Left Nostril: No epistaxis or septal hematoma.      Mouth/Throat:      Lips: Pink. No lesions.      Mouth: Mucous membranes are moist.      Tongue: No lesions.   Eyes:      General: Vision grossly intact. No visual field deficit or scleral icterus.        Right eye: No discharge.         Left eye: No discharge.      Extraocular Movements: Extraocular movements intact.      Right eye: Normal extraocular motion and no nystagmus.      Left eye: Normal extraocular motion and no nystagmus.      Conjunctiva/sclera: Conjunctivae normal.      Right eye: Right conjunctiva is not injected. No chemosis, exudate or hemorrhage.     Left eye: Left conjunctiva is not injected. No chemosis, exudate or hemorrhage.     Pupils: Pupils are equal, round, and reactive to light.   Neck:      Thyroid: No thyromegaly.      Vascular: No carotid bruit or JVD.      Trachea: Trachea and phonation normal. No tracheal deviation.   Cardiovascular:      Rate and Rhythm: Normal rate and regular rhythm.      Pulses: Normal pulses.      Heart sounds: Normal heart sounds, S1 normal and S2 normal. No murmur heard.     No friction rub. No gallop.   Pulmonary:      Effort: Pulmonary  effort is normal. No accessory muscle usage, respiratory distress or retractions.      Breath sounds: Normal breath sounds and air entry. No stridor or decreased air movement. No decreased breath sounds, wheezing, rhonchi or rales.   Chest:      Chest wall: Tenderness present. No deformity, crepitus or edema.   Abdominal:      General: There is no distension.      Palpations: Abdomen is soft. There is no mass.      Tenderness: There is generalized abdominal tenderness. There is no guarding or rebound.      Hernia: No hernia is present.   Musculoskeletal:         General: No swelling or deformity. Normal range of motion.      Cervical back: Normal range of motion and neck supple. Tenderness present. No edema, erythema, signs of trauma, rigidity, torticollis or crepitus. Muscular tenderness present. No pain with movement or spinous process tenderness. Normal range of motion.      Right lower leg: No edema.      Left lower leg: No edema.      Comments: Right upper extremity: Sling in place.  Tender over the humeral head and over the right hand with noted ecchymosis to the dorsum of the hand. No snuffbox tenderness.  Neurovascular intact.  Limited range of motion of the entire right upper extremity due to pain.  Tenderness to the left side of the chest wall with no crepitus, deformity or ecchymosis.  No midline spinal tenderness.  Pelvis stable.  Full range of motion of lower extremities and left upper extremity.  All extremities neurovascular intact.   Lymphadenopathy:      Cervical: No cervical adenopathy.   Skin:     General: Skin is warm and dry.      Capillary Refill: Capillary refill takes less than 2 seconds.      Coloration: Skin is not pale.      Findings: Abrasion present. No erythema or rash.   Neurological:      General: No focal deficit present.      Mental Status: He is alert and oriented to person, place, and time.      GCS: GCS eye subscore is 4. GCS verbal subscore is 5. GCS motor subscore is 6.       Cranial Nerves: Cranial nerves 2-12 are intact. No cranial nerve deficit, dysarthria or facial asymmetry.      Sensory: Sensation is intact. No sensory deficit.      Motor: Motor function is intact. No weakness, tremor, atrophy, abnormal muscle tone or seizure activity.      Coordination: Coordination is intact. Coordination normal.      Gait: Gait is intact.      Deep Tendon Reflexes: Reflexes are normal and symmetric. Reflexes normal.   Psychiatric:         Behavior: Behavior normal.         Results Reviewed       Procedure Component Value Units Date/Time    Beta Hydroxybutyrate [[id number]]  (Normal) Collected: 10/25/24 0539    Lab Status: Final result Specimen: Blood from Arm, Left Updated: 10/25/24 0927     Beta- Hydroxybutyrate 0.10 mmol/L     Basic metabolic panel [[id number]]  (Abnormal) Collected: 10/25/24 0539    Lab Status: Final result Specimen: Blood from Arm, Left Updated: 10/25/24 0723     Sodium 138 mmol/L      Potassium 4.3 mmol/L      Chloride 104 mmol/L      CO2 24 mmol/L      ANION GAP 10 mmol/L      BUN 36 mg/dL      Creatinine 1.46 mg/dL      Glucose 182 mg/dL      Calcium 9.6 mg/dL      eGFR 45 ml/min/1.73sq m     Narrative:      National Kidney Disease Foundation guidelines for Chronic Kidney Disease (CKD):     Stage 1 with normal or high GFR (GFR > 90 mL/min/1.73 square meters)    Stage 2 Mild CKD (GFR = 60-89 mL/min/1.73 square meters)    Stage 3A Moderate CKD (GFR = 45-59 mL/min/1.73 square meters)    Stage 3B Moderate CKD (GFR = 30-44 mL/min/1.73 square meters)    Stage 4 Severe CKD (GFR = 15-29 mL/min/1.73 square meters)    Stage 5 End Stage CKD (GFR <15 mL/min/1.73 square meters)  Note: GFR calculation is accurate only with a steady state creatinine    Magnesium [[id number]]  (Normal) Collected: 10/25/24 0539    Lab Status: Final result Specimen: Blood from Arm, Left Updated: 10/25/24 0723     Magnesium 2.5 mg/dL     Phosphorus [[id number]]  (Normal) Collected: 10/25/24 0539    Lab Status:  Final result Specimen: Blood from Arm, Left Updated: 10/25/24 0723     Phosphorus 3.7 mg/dL     CBC and differential [[id number]] Collected: 10/25/24 0539    Lab Status: Final result Specimen: Blood from Arm, Left Updated: 10/25/24 0607     WBC 7.00 Thousand/uL      RBC 4.32 Million/uL      Hemoglobin 12.8 g/dL      Hematocrit 38.2 %      MCV 88 fL      MCH 29.6 pg      MCHC 33.5 g/dL      RDW 13.0 %      MPV 10.7 fL      Platelets 222 Thousands/uL      nRBC 0 /100 WBCs      Segmented % 53 %      Immature Grans % 1 %      Lymphocytes % 31 %      Monocytes % 11 %      Eosinophils Relative 3 %      Basophils Relative 1 %      Absolute Neutrophils 3.73 Thousands/µL      Absolute Immature Grans 0.08 Thousand/uL      Absolute Lymphocytes 2.18 Thousands/µL      Absolute Monocytes 0.75 Thousand/µL      Eosinophils Absolute 0.20 Thousand/µL      Basophils Absolute 0.06 Thousands/µL     Comprehensive metabolic panel [[id number]]  (Abnormal) Collected: 10/24/24 2111    Lab Status: Final result Specimen: Blood from Arm, Left Updated: 10/24/24 2140     Sodium 134 mmol/L      Potassium 4.9 mmol/L      Chloride 100 mmol/L      CO2 19 mmol/L      ANION GAP 15 mmol/L      BUN 35 mg/dL      Creatinine 1.48 mg/dL      Glucose 274 mg/dL      Calcium 9.8 mg/dL      AST 18 U/L      ALT 17 U/L      Alkaline Phosphatase 80 U/L      Total Protein 7.3 g/dL      Albumin 4.1 g/dL      Total Bilirubin 0.98 mg/dL      eGFR 44 ml/min/1.73sq m     Narrative:      National Kidney Disease Foundation guidelines for Chronic Kidney Disease (CKD):     Stage 1 with normal or high GFR (GFR > 90 mL/min/1.73 square meters)    Stage 2 Mild CKD (GFR = 60-89 mL/min/1.73 square meters)    Stage 3A Moderate CKD (GFR = 45-59 mL/min/1.73 square meters)    Stage 3B Moderate CKD (GFR = 30-44 mL/min/1.73 square meters)    Stage 4 Severe CKD (GFR = 15-29 mL/min/1.73 square meters)    Stage 5 End Stage CKD (GFR <15 mL/min/1.73 square meters)  Note: GFR calculation is  accurate only with a steady state creatinine    Phosphorus [[id number]]  (Normal) Collected: 10/24/24 2111    Lab Status: Final result Specimen: Blood from Arm, Left Updated: 10/24/24 2140     Phosphorus 2.5 mg/dL     Magnesium [[id number]]  (Abnormal) Collected: 10/24/24 2111    Lab Status: Final result Specimen: Blood from Arm, Left Updated: 10/24/24 2140     Magnesium 1.8 mg/dL     Protime-INR [[id number]]  (Normal) Collected: 10/24/24 2111    Lab Status: Final result Specimen: Blood from Arm, Left Updated: 10/24/24 2134     Protime 14.2 seconds      INR 1.05    Narrative:      INR Therapeutic Range    Indication                                             INR Range      Atrial Fibrillation                                               2.0-3.0  Hypercoagulable State                                    2.0.2.3  Left Ventricular Asist Device                            2.0-3.0  Mechanical Heart Valve                                  -    Aortic(with afib, MI, embolism, HF, LA enlargement,    and/or coagulopathy)                                     2.0-3.0 (2.5-3.5)     Mitral                                                             2.5-3.5  Prosthetic/Bioprosthetic Heart Valve               2.0-3.0  Venous thromboembolism (VTE: VT, PE        2.0-3.0    APTT [[id number]]  (Normal) Collected: 10/24/24 2111    Lab Status: Final result Specimen: Blood from Arm, Left Updated: 10/24/24 2134     PTT 29 seconds     CBC and differential [[id number]] Collected: 10/24/24 2111    Lab Status: Final result Specimen: Blood from Arm, Left Updated: 10/24/24 2123     WBC 7.73 Thousand/uL      RBC 4.54 Million/uL      Hemoglobin 13.7 g/dL      Hematocrit 41.0 %      MCV 90 fL      MCH 30.2 pg      MCHC 33.4 g/dL      RDW 12.9 %      MPV 10.7 fL      Platelets 243 Thousands/uL      nRBC 0 /100 WBCs      Segmented % 59 %      Immature Grans % 1 %      Lymphocytes % 27 %      Monocytes % 10 %      Eosinophils Relative 2 %      Basophils  Relative 1 %      Absolute Neutrophils 4.60 Thousands/µL      Absolute Immature Grans 0.10 Thousand/uL      Absolute Lymphocytes 2.10 Thousands/µL      Absolute Monocytes 0.75 Thousand/µL      Eosinophils Absolute 0.14 Thousand/µL      Basophils Absolute 0.04 Thousands/µL             XR shoulder 2+ views RIGHT   ED Interpretation by Donato Montenegro DO (10/24 2254)   Humeral head fracture.  No dislocation.      Final Interpretation by Prabhu Lara MD (10/25 0814)   Slightly impacted right humeral head and neck fracture. Also refer to follow-up CT exam from the same date.         Computerized Assisted Algorithm (CAA) may have been used to analyze all applicable images.         Workstation performed: [id number]         XR humerus RIGHT   ED Interpretation by Donato Montenegro DO (10/24 2254)   No obvious acute abnormalities.      Final Interpretation by Prabhu Lara MD (10/25 0814)      No acute osseous abnormality.         Computerized Assisted Algorithm (CAA) may have been used to analyze all applicable images.         Workstation performed: [id number]         XR forearm 2 views RIGHT   ED Interpretation by Donato Montenegro DO (10/24 2254)   No obvious acute abnormalities.      Final Interpretation by Prabhu Lara MD (10/25 0814)      No acute osseous abnormality.         Computerized Assisted Algorithm (CAA) may have been used to analyze all applicable images.            Workstation performed: [id number]         XR wrist 3+ views RIGHT   ED Interpretation by Donato Montenegro DO (10/24 2255)   No obvious acute abnormalities.        Final Interpretation by Prabhu Lara MD (10/25 0827)   Degenerative change noted throughout the wrist without acute osseous abnormality.         Computerized Assisted Algorithm (CAA) may have been used to analyze all applicable images.            Workstation performed: [id number]         XR hand 3+ views RIGHT   ED Interpretation by Donato Montenegro DO  (10/24 2255)   Fracture noted to the neck of third metacarpal as well as of the distal aspect of the proximal phalanx of the thumb.      Final Interpretation by Prabhu Lara MD (10/25 0827)   Third metacarpal neck fracture, nondisplaced. No further suspected fractures.         Computerized Assisted Algorithm (CAA) may have been used to analyze all applicable images.         Workstation performed: [id number]         CT head without contrast   ED Interpretation by Donato Montenegro DO (10/24 2244)   No obvious acute intracranial abnormality.  No obvious fracture.      Final Interpretation by Penny Price MD (10/25 0744)      No acute intracranial abnormality.  Stable chronic microangiopathic changes within the brain and encephalomalacia of the left cerebral hemisphere.   Final report is in agreement with preliminary reading provided by [facility].                  Workstation performed: [id number]         CT spine cervical without contrast   ED Interpretation by Donato Montenegro DO (10/24 2245)   No obvious acute abnormality/fracture.      Final Interpretation by Penny Price MD (10/25 0732)      No cervical spine fracture or traumatic malalignment.   Cervical spondylosis.   Final report is in agreement with preliminary reading by [facility].               Workstation performed: [id number]         CT chest abdomen pelvis w contrast   ED Interpretation by Donato Montenegro DO (10/24 2245)   No obvious acute pneumothorax or rib fractures.  Appears to be a fracture of the right humeral head.      Final Interpretation by Penny Price MD (10/25 1011)         1. Acute fracture of the right proximal humerus.   2. Probably old left humerus fracture.   3. Healed and subacute right rib fractures.   4. Pulmonary granulomata and right pleural calcifications most likely the sequela of prior trauma or infection.   Final report is in agreement with preliminary reading provided by Dr. Gregory Klisch  from [facility] on 10/25/2024 at 12:17 a.m.               Workstation performed: [id number]           PROCEDURE INFORMATION: Exam: CT Head Without Contrast Exam date and time: 10/24/2024 10:08 PM Age: 78 years old Clinical indication: Injury or trauma; Fall; Concussion/head injury TECHNIQUE: Imaging protocol: Computed tomography of the head without contrast. Radiation optimization: All CT scans at this facility use at least one of these dose optimization techniques: automated exposure control; mA and/or kV adjustment per patient size (includes targeted exams where dose is matched to clinical indication); or iterative reconstruction. 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 No acute intracranial hemorrhage; no acute territorial infarct by CT criteria. Cerebral ventricles: No ventriculomegaly. Paranasal sinuses: Visualized sinuses are unremarkable. No fluid levels. Mastoid air cells: No mastoid effusion. Bones: Unremarkable. No acute fracture. Soft tissues: Unremarkable. IMPRESSION: Nonacute abnormalities.    PROCEDURE INFORMATION: Exam: CT Cervical Spine Without Contrast Exam date and time: 10/24/2024 10:08 PM Age: 78 years old Clinical indication: Injury or trauma; Fall; Concussion/head injury TECHNIQUE: Imaging protocol: Computed tomography of the cervical spine without contrast. Radiation optimization: All CT scans at this facility use at least one of these dose optimization techniques: automated exposure control; mA and/or kV adjustment per patient size (includes targeted exams where dose is matched to clinical indication); or iterative reconstruction. COMPARISON: CT SPINE CERVICAL WO CONTRAST 9/28/2023 1:47 PM FINDINGS: Bones: No acute fracture. Normal alignment. No significant disc bulge or herniation. No severe spinal canal stenosis. No significant neural foraminal  narrowing. Lungs: Lung apices are normal. Soft tissues: Unremarkable. IMPRESSION: No acute findings. Dictated and Authenticated by: Luis Sauer MD 10/25/2024 12:24 AM Eastern Time ([place] & [place]    PROCEDURE INFORMATION: Exam: CT Chest With Contrast; Diagnostic Exam date and time: 10/24/2024 10:13 PM Age: 78 years old Clinical indication: Injury or trauma; Fall; Abdominal wall; Blunt trauma (contusions or hematomas) TECHNIQUE: Imaging protocol: Diagnostic computed tomography of the chest with contrast. 3D rendering (Not supervised by radiologist): MIP and/or 3D reconstructed images were created by the technologist. Radiation optimization: All CT scans at this facility use at least one of these dose optimization techniques: automated exposure control; mA and/or kV adjustment per patient size (includes targeted exams where dose is matched to clinical indication); or iterative reconstruction. COMPARISON: CT CHEST ABDOMEN PELVIS W CONTRAST 9/28/2023 1:55 PM FINDINGS: Lungs: Unremarkable. No consolidation. No masses. Pleural spaces: Unremarkable. No pneumothorax. No pleural effusion. Heart: Unremarkable. No cardiomegaly. No pericardial effusion. Lymph nodes: Unremarkable. No enlarged lymph nodes. Vasculature: Unremarkable. No aortic aneurysm. Bones/joints: There is an impacted nondisplaced right humeral neck fracture, new since 09/28/2023. There is a left nondisplaced impacted fracture of the left humeral neck. There are chronic rib fractures. Soft tissues: Unremarkable. IMPRESSION: There is an impacted nondisplaced right humeral neck fracture, new since 09/28/2023. There is a left nondisplaced impacted fracture of the left humeral neck. There are chronic rib fractures.      ECG 12 Lead Documentation Only    Date/Time: 10/24/2024 9:11 PM    Performed by: Donato Montenegro DO  Authorized by: Donato Montenegro DO    Indications / Diagnosis:  Fall  ECG reviewed by me, the ED Provider: yes    Patient location:   ED  Previous ECG:     Previous ECG:  Compared to current    Comparison ECG info:  9/28/23    Similarity:  No change    Comparison to cardiac monitor: Yes    Interpretation:     Interpretation: abnormal    Rate:     ECG rate:  74    ECG rate assessment: normal    Rhythm:     Rhythm: sinus rhythm    Ectopy:     Ectopy: none    QRS:     QRS axis:  Normal    QRS intervals:  Normal  Conduction:     Conduction: normal    ST segments:     ST segments:  Normal  T waves:     T waves: flattening      Flattening:  III and aVF      ED Medication and Procedure Management   Prior to Admission Medications   Prescriptions Last Dose Informant Patient Reported? Taking?   Aspirin-Calcium Carbonate  MG TABS   Yes No   Sig: Take 1 tablet by mouth daily   Cholecalciferol (Vitamin D3) 50 MCG (2000 UT) capsule   Yes No   Sig: Take 2,000 Units by mouth every morning   Evolocumab with Infusor 420 MG/3.5ML SOCT   Yes No   Sig: Inject 420 mg under the skin   Jardiance 10 MG TABS tablet   Yes Yes   Sig: Take 1 tablet by mouth in the morning   Ozempic, 0.25 or 0.5 MG/DOSE, 2 MG/3ML injection pen   Yes Yes   Sig: Inject 0.25 mg once weekly for 4 weeks, then increase to 0.5 mg weekly thereafter   amLODIPine-benazepril (LOTREL 5-20) 5-20 MG per capsule   Yes No   Sig: Take 1 capsule by mouth daily   atorvastatin (LIPITOR) 40 mg tablet   Yes No   Sig: Take 40 mg by mouth every morning   citalopram (CeleXA) 40 mg tablet   Yes Yes   Sig: Take 40 mg by mouth daily   metoprolol succinate (TOPROL-XL) 25 mg 24 hr tablet   Yes Yes   Sig: Take 25 mg by mouth daily   omeprazole (PriLOSEC) 20 mg delayed release capsule   Yes Yes   Sig: Take 20 mg by mouth daily      Facility-Administered Medications: None     Current Discharge Medication List        CONTINUE these medications which have NOT CHANGED    Details   citalopram (CeleXA) 40 mg tablet Take 40 mg by mouth daily      Jardiance 10 MG TABS tablet Take 1 tablet by mouth in the morning       metoprolol succinate (TOPROL-XL) 25 mg 24 hr tablet Take 25 mg by mouth daily      omeprazole (PriLOSEC) 20 mg delayed release capsule Take 20 mg by mouth daily      Ozempic, 0.25 or 0.5 MG/DOSE, 2 MG/3ML injection pen Inject 0.25 mg once weekly for 4 weeks, then increase to 0.5 mg weekly thereafter      amLODIPine-benazepril (LOTREL 5-20) 5-20 MG per capsule Take 1 capsule by mouth daily      Aspirin-Calcium Carbonate  MG TABS Take 1 tablet by mouth daily      atorvastatin (LIPITOR) 40 mg tablet Take 40 mg by mouth every morning      Cholecalciferol (Vitamin D3) 50 MCG (2000 UT) capsule Take 2,000 Units by mouth every morning      Evolocumab with Infusor 420 MG/3.5ML SOCT Inject 420 mg under the skin           No discharge procedures on file.  ED SEPSIS DOCUMENTATION   Time reflects when diagnosis was documented in both MDM as applicable and the Disposition within this note       Time User Action Codes Description Comment    10/25/2024 12:37 AM Donato Montenegro [W19.XXXA] Fall, initial encounter     10/25/2024 12:37 AM Donato Montenegro [S42.301A] Right humeral fracture     10/25/2024 12:37 AM Donato Montenegro [S42.302A] Left humeral fracture     10/25/2024 12:38 AM Donato Montenegro [S62.332A] Fracture of neck of third metacarpal bone of right hand     10/25/2024 12:39 AM Donato Montenegro [S62.511A] Fracture of proximal phalanx of right thumb     10/25/2024 12:39 AM Donato Montenegro [T14.8XXA] Contusion     10/25/2024 12:40 AM Donato Montenegro [E87.1] Hyponatremia     10/25/2024 12:40 AM Donato Montenegro [E87.20] Metabolic acidosis     10/25/2024 12:40 AM Kartik Mazariegos Modify [W19.XXXA] Fall, initial encounter     10/25/2024 12:40 AM Kartik Mazariegos Add [T07.XXXA] Fractures involving multiple body regions                  Donato Montenegro DO  10/25/24 1519       Donato Montenegro, DO  10/25/24 1522

## 2024-10-25 NOTE — CONSULTS
Consultation - Orthopedics   Name: Ian Medina 78 y.o. male I MRN: [id number]  Unit/Bed#: 426-01 I Date of Admission: 10/24/2024   Date of Service: 10/25/2024 I Hospital Day: 0   Inpatient consult to Orthopedic Surgery  Consult performed by: Kosta Scanlon MD  Consult ordered by: Kartik Mazariegos DO        Physician Requesting Evaluation: Kosta Lanier DO   Reason for Evaluation / Principal Problem: Right proximal humerus fracture, right third metacarpal neck fracture    Assessment & Plan  Closed fracture of right proximal humerus  Ian is a 78-year-old male with a right proximal humerus fracture and right metacarpal neck fracture after a fall sustained a few days ago.  Both injuries can be treated without surgery.  Regarding the proximal humerus fracture I recommend sling for comfort and no lifting with the right upper extremity.  He can do gentle pendulum exercises with occupational therapy while admitted.  Regarding his right metacarpal fracture he is currently in a splint that was placed by the ER, I recommend he continue the splint and no lifting with that hand.  He can do elbow range of motion as tolerated.  He should see me in the office for outpatient follow-up in about 2 weeks to start physical therapy and get repeat radiographs.  As for his hand fracture I recommend he see Dr. Jesus Mehta for further management.  All questions were answered and plan was relayed to the primary team.  Closed displaced fracture of neck of third metacarpal bone of right hand    I have discussed the above management plan in detail with the primary service.     History of Present Illness   HPI: Ian Medina is a 78 y.o. year old male who presents with with right upper extremity pain after a fall 4 days ago.  Per the patient and his family he is having increasing difficulty performing activities of daily living.  He presented to the ER where imaging was obtained and demonstrated multiple fractures in  "the right upper extremity.  His right hand was placed into a splint and his right arm was placed into a sling and he was admitted to medicine for further management.  He complains of pain about the right shoulder and some discomfort at the right hand.    Review of Systems significant for findings described in the HPI.  I have reviewed the patient's PMH, PSH, Social History, Family History, Meds, and Allergies    Objective :  Temp:  [97.4 °F (36.3 °C)-97.6 °F (36.4 °C)] 97.4 °F (36.3 °C)  HR:  [70-87] 78  BP: (103-159)/(52-65) 114/61  Resp:  [16-20] 20  SpO2:  [91 %-100 %] 95 %  O2 Device: None (Room air)  Physical ExamOrtho Exam     On examination of the right upper extremity the patient's arm is in a sling and he has a short arm splint on the right forearm.  There is resolving ecchymosis about the right proximal arm.  He is mildly tender to palpation over the proximal humerus.  His sensations intact to light touch over the axillary nerve distribution and exposed fingers.  He is able to weakly fire deltoid which is limited by pain.  His fingertips are well-perfused with appropriate capillary refill.      Lab Results: I have reviewed the following results:   Recent Labs     10/24/24  2111 10/25/24  0539   WBC 7.73 7.00   HGB 13.7 12.8   HCT 41.0 38.2    222   BUN 35* 36*   CREATININE 1.48* 1.46*   PTT 29  --    INR 1.05  --      Blood Culture: No results found for: \"BLOODCX\"  Wound Culture: No results found for: \"WOUNDCULT\"    Imaging Results Review: I personally reviewed the following image studies in PACS and associated radiology reports: CT chest and xray(s). My interpretation of the radiology images/reports is: Radiographs of the right humerus and shoulder demonstrate minimally displaced impacted fracture at the surgical neck without dislocation.  Radiographs of the right hand wrist and forearm demonstrate a metacarpal neck fracture of the third metacarpal with minimal angulation.  CT scan of the chest " abdomen pelvis demonstrate an acute minimally displaced fracture of the surgical neck of the right humerus and likely chronic degenerative changes of the left shoulder.  Other Study Results Review: No additional pertinent studies reviewed.

## 2024-10-25 NOTE — OCCUPATIONAL THERAPY NOTE
"    Occupational Therapy Evaluation     Patient Name: Ian Medina  Today's Date: 10/25/2024  Problem List  Principal Problem:    Fractures involving multiple body regions  Active Problems:    Type 2 diabetes mellitus (HCC)    Primary hypertension    Major depressive disorder    GERD (gastroesophageal reflux disease)    Closed fracture of right proximal humerus    Closed displaced fracture of neck of third metacarpal bone of right hand    Past Medical History  Past Medical History:   Diagnosis Date    Carotid artery embolism, left     Diabetes mellitus (HCC)     Hypertension     Stroke (HCC)      Past Surgical History  Past Surgical History:   Procedure Laterality Date    CATARACT EXTRACTION, BILATERAL               10/25/24 2790   OT Last Visit   OT Visit Date 10/25/24   Note Type   Note type Evaluation   Pain Assessment   Pain Assessment Tool 0-10   Pain Score 10 - Worst Possible Pain   Pain Location/Orientation Orientation: Right;Location: Arm   Restrictions/Precautions   Weight Bearing Precautions Per Order Yes   RUE Weight Bearing Per Order NWB   Braces or Orthoses Sling   Other Precautions Fall Risk;Pain;Multiple lines;Chair Alarm;Bed Alarm   Home Living   Type of Home House   Home Layout Two level;Performs ADLs on one level;Able to live on main level with bedroom/bathroom;Stairs to enter with rails  (7 MELIDA c HR)   Bathroom Shower/Tub Walk-in shower   Bathroom Toilet Standard   Bathroom Equipment Toilet raiser;Shower chair;Grab bars in shower   Bathroom Accessibility Accessible   Home Equipment Cane;Grab bars   Additional Comments pt reports no device at baseline during functional mobility   Prior Function   Level of Iron Needs assistance with IADLS;Needs assistance with ADLs;Needs assistance with functional mobility   Lives With Other (Comment)  (\"other people\")   Receives Help From Family;Home health   IADLs Family/Friend/Other provides transportation;Family/Friend/Other provides " "meals;Family/Friend/Other provides medication management   Falls in the last 6 months >10   Vocational Retired   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; 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) with ADLs at times   Subjective   Subjective \"I need new water\"   ADL   Where Assessed Edge of bed   Grooming Assistance 4  Minimal Assistance   UB Bathing Assistance 3  Moderate Assistance   LB Bathing Assistance 3  Moderate Assistance   UB Dressing Assistance 4  Minimal Assistance   LB Dressing Assistance 2  Maximal Assistance   Toileting Assistance  3  Moderate Assistance   Additional Comments Given fxl performance skills + medical complexity, therapist suspecting via clinical judgement + skilled analysis; pt currently requires stated assist above to perform each area of ADL d/t limitations including: generalized muscle weakness, sitting/standing balance deficits, fxl activity tolerance limitations, difficulty performing bend/reach-anterior trunk flexion, requires external assist to complete transitional movements, decreased core strength, decreased postural control, instability in stance, cognitive deficits, safety awareness concerns, and decreased problem solving abilities.   Bed Mobility   Supine to Sit 5  Supervision   Additional items Bedrails;Increased time required;Verbal cues   Sit to Supine   (seated in chair at end of session)   Additional Comments pt on RA during session; SPO2 WFL with no complaints of SOB   Transfers   Sit to Stand 4  Minimal assistance   Additional items Assist x 2;Increased time required;Verbal cues;Bedrails   Stand to Sit 3  Moderate assistance   Additional items Assist x 2;Increased time required;Armrests;Verbal cues   Stand pivot 3  Moderate assistance   Additional items Assist x 2;Increased time required;Verbal cues;Armrests   Additional Comments pt performs x2 functional transfers with min-mod (A) x2; difficulties bearing weight to R LE during session and only able to perform SPT to chair this session; utilizes " handheld (A)   Functional Mobility   Additional Comments pt unable to attempt this session due to significant pain and safety concerns   Balance   Static Sitting Fair   Dynamic Sitting Fair   Static Standing Fair -   Dynamic Standing Poor +   Activity Tolerance   Activity Tolerance Patient limited by fatigue;Patient limited by pain   RUE Assessment   RUE Assessment X  (NWB in sling and splint)   LUE Assessment   LUE Assessment WFL   Hand Function   Gross Motor Coordination Impaired   Fine Motor Coordination Impaired   Hand Function Comments impaired R UE   Sensation   Light Touch No apparent deficits   Sharp/Dull No apparent deficits   Psychosocial   Psychosocial (WDL) WDL   Cognition   Overall Cognitive Status Impaired   Arousal/Participation Alert;Cooperative   Attention Attends with cues to redirect   Orientation Level Oriented to person;Disoriented to place;Disoriented to time;Disoriented to situation   Memory Decreased long term memory;Decreased recall of biographical information;Decreased short term memory;Decreased recall of recent events   Following Commands Follows one step commands with increased time or repetition   Assessment   Limitation Decreased ADL status;Decreased UE strength;Decreased UE ROM;Decreased Safe judgement during ADL;Decreased cognition;Decreased endurance;Decreased self-care trans;Decreased high-level ADLs;Decreased fine motor control   Assessment Pt is a 78 y.o. male seen for OT evaluation s/p admit to [facility] on 10/24/2024 w/ Fractures involving multiple body regions.  Comorbidities affecting pt's functional performance at time of assessment include:  fractures involving multiple body regions, DM, HTN, MDD, GERD, closed fracture of R proximal humerus, closed displaced fracture of neck of third metacarpal of R hand, CVA, carotid artery embolism  . Personal factors affecting pt at time of IE include:steps to enter environment, limited home support, behavioral pattern, difficulty performing  ADLS, difficulty performing IADLS , level of education, limited insight into deficits, compliance, decreased initiation and engagement , and health management . Prior to admission, pt was (A) with ADLs and IADLs with use of no device during mobility. Upon evaluation: Pt requires (S)-max (A) with use of handheld (A) x2  2* the following deficits impacting occupational performance: weakness, decreased ROM, decreased strength, decreased balance, decreased tolerance, impaired GMC, impaired FMC, impaired initiation, impaired memory, impaired sequencing, impaired problem solving, impulsivity, decreased safety awareness, increased pain, and orthopedic restrictions. Pt to benefit from continued skilled OT tx while in the hospital to address deficits as defined above and maximize level of functional independence w ADL's and functional mobility. Occupational Performance areas to address include: grooming, bathing/shower, toilet hygiene, dressing, functional mobility, community mobility, and clothing management. The patient's raw score on the -PAC Daily Activity Inpatient Short Form is 14. A raw score of less than 19 suggests the patient may benefit from discharge to post-acute rehabilitation services. Discharge recommendation at this time is level I maximum resource intensity.  Pt benefited from co-evaluation of skilled OT and PT therapists in order to most appropriately address functional deficits d/t extensive assistance required for safe functional mobility, decreased activity tolerance, and regression from functioning level prior to admission and/or onset of present illness. OT/PT objectives were addressed separately; please see PT note for specific goal areas targeted.   Goals   Patient Goals to go home   Short Term Goal  pt will perform L UE strengthening exercises   Long Term Goal #1 pt will perform functional mobility with LRD at min (A) level   Long Term Goal #2 pt will demonstrate toilet transfers and hygiene at  min (A) level   Long Term Goal pt will demonstrate UB/LB bathing and grooming tasks at min (A) level with or without LH AE PRN   Plan   Treatment Interventions ADL retraining;Functional transfer training;UE strengthening/ROM;Endurance training;Cognitive reorientation;Patient/family training;Equipment evaluation/education;Activityengagement;Compensatory technique education   Goal Expiration Date 11/08/24   OT Frequency 3-5x/wk   Discharge Recommendation   Rehab Resource Intensity Level, OT I (Maximum Resource Intensity)   AM-PAC Daily Activity Inpatient   Lower Body Dressing 2   Bathing 2   Toileting 2   Upper Body Dressing 2   Grooming 3   Eating 3   Daily Activity Raw Score 14   Daily Activity Standardized Score (Calc for Raw Score >=11) 33.39   AM-PAC Applied Cognition Inpatient   Following a Speech/Presentation 3   Understanding Ordinary Conversation 3   Taking Medications 2   Remembering Where Things Are Placed or Put Away 2   Remembering List of 4-5 Errands 1   Taking Care of Complicated Tasks 1   Applied Cognition Raw Score 12   Applied Cognition Standardized Score 28.82

## 2024-10-25 NOTE — ED NOTES
R thumb pika splint applied by ED physician, R <3 sec cap refill +2 palp R radial pulse patient able to wiggle fingers, R sling applied     Mame Muñiz RN  10/25/24 1296

## 2024-10-26 LAB
ANION GAP SERPL CALCULATED.3IONS-SCNC: 9 MMOL/L (ref 4–13)
BASOPHILS # BLD AUTO: 0.05 THOUSANDS/ΜL (ref 0–0.1)
BASOPHILS NFR BLD AUTO: 1 % (ref 0–1)
BUN SERPL-MCNC: 30 MG/DL (ref 5–25)
CALCIUM SERPL-MCNC: 9 MG/DL (ref 8.4–10.2)
CHLORIDE SERPL-SCNC: 106 MMOL/L (ref 96–108)
CO2 SERPL-SCNC: 21 MMOL/L (ref 21–32)
CREAT SERPL-MCNC: 1.35 MG/DL (ref 0.6–1.3)
EOSINOPHIL # BLD AUTO: 0.42 THOUSAND/ΜL (ref 0–0.61)
EOSINOPHIL NFR BLD AUTO: 7 % (ref 0–6)
ERYTHROCYTE [DISTWIDTH] IN BLOOD BY AUTOMATED COUNT: 13.2 % (ref 11.6–15.1)
GFR SERPL CREATININE-BSD FRML MDRD: 50 ML/MIN/1.73SQ M
GLUCOSE SERPL-MCNC: 145 MG/DL (ref 65–140)
GLUCOSE SERPL-MCNC: 181 MG/DL (ref 65–140)
GLUCOSE SERPL-MCNC: 184 MG/DL (ref 65–140)
GLUCOSE SERPL-MCNC: 191 MG/DL (ref 65–140)
GLUCOSE SERPL-MCNC: 224 MG/DL (ref 65–140)
HCT VFR BLD AUTO: 37.7 % (ref 36.5–49.3)
HGB BLD-MCNC: 12.3 G/DL (ref 12–17)
IMM GRANULOCYTES # BLD AUTO: 0.08 THOUSAND/UL (ref 0–0.2)
IMM GRANULOCYTES NFR BLD AUTO: 1 % (ref 0–2)
LYMPHOCYTES # BLD AUTO: 2.11 THOUSANDS/ΜL (ref 0.6–4.47)
LYMPHOCYTES NFR BLD AUTO: 33 % (ref 14–44)
MAGNESIUM SERPL-MCNC: 2.1 MG/DL (ref 1.9–2.7)
MCH RBC QN AUTO: 29.9 PG (ref 26.8–34.3)
MCHC RBC AUTO-ENTMCNC: 32.6 G/DL (ref 31.4–37.4)
MCV RBC AUTO: 92 FL (ref 82–98)
MONOCYTES # BLD AUTO: 0.61 THOUSAND/ΜL (ref 0.17–1.22)
MONOCYTES NFR BLD AUTO: 9 % (ref 4–12)
NEUTROPHILS # BLD AUTO: 3.19 THOUSANDS/ΜL (ref 1.85–7.62)
NEUTS SEG NFR BLD AUTO: 49 % (ref 43–75)
NRBC BLD AUTO-RTO: 0 /100 WBCS
PHOSPHATE SERPL-MCNC: 3.4 MG/DL (ref 2.3–4.1)
PLATELET # BLD AUTO: 217 THOUSANDS/UL (ref 149–390)
PMV BLD AUTO: 10.5 FL (ref 8.9–12.7)
POTASSIUM SERPL-SCNC: 4.5 MMOL/L (ref 3.5–5.3)
RBC # BLD AUTO: 4.11 MILLION/UL (ref 3.88–5.62)
SODIUM SERPL-SCNC: 136 MMOL/L (ref 135–147)
WBC # BLD AUTO: 6.46 THOUSAND/UL (ref 4.31–10.16)

## 2024-10-26 PROCEDURE — 83735 ASSAY OF MAGNESIUM: CPT | Performed by: INTERNAL MEDICINE

## 2024-10-26 PROCEDURE — 80048 BASIC METABOLIC PNL TOTAL CA: CPT | Performed by: INTERNAL MEDICINE

## 2024-10-26 PROCEDURE — 85025 COMPLETE CBC W/AUTO DIFF WBC: CPT | Performed by: INTERNAL MEDICINE

## 2024-10-26 PROCEDURE — 99232 SBSQ HOSP IP/OBS MODERATE 35: CPT

## 2024-10-26 PROCEDURE — 82948 REAGENT STRIP/BLOOD GLUCOSE: CPT

## 2024-10-26 PROCEDURE — 84100 ASSAY OF PHOSPHORUS: CPT | Performed by: INTERNAL MEDICINE

## 2024-10-26 RX ADMIN — HEPARIN SODIUM 5000 UNITS: 5000 INJECTION, SOLUTION INTRAVENOUS; SUBCUTANEOUS at 14:00

## 2024-10-26 RX ADMIN — INSULIN LISPRO 2 UNITS: 100 INJECTION, SOLUTION INTRAVENOUS; SUBCUTANEOUS at 08:18

## 2024-10-26 RX ADMIN — HEPARIN SODIUM 5000 UNITS: 5000 INJECTION, SOLUTION INTRAVENOUS; SUBCUTANEOUS at 21:31

## 2024-10-26 RX ADMIN — METOPROLOL SUCCINATE 25 MG: 25 TABLET, EXTENDED RELEASE ORAL at 08:19

## 2024-10-26 RX ADMIN — HEPARIN SODIUM 5000 UNITS: 5000 INJECTION, SOLUTION INTRAVENOUS; SUBCUTANEOUS at 05:32

## 2024-10-26 RX ADMIN — CITALOPRAM HYDROBROMIDE 40 MG: 20 TABLET ORAL at 08:18

## 2024-10-26 RX ADMIN — INSULIN LISPRO 2 UNITS: 100 INJECTION, SOLUTION INTRAVENOUS; SUBCUTANEOUS at 11:46

## 2024-10-26 RX ADMIN — INSULIN LISPRO 1 UNITS: 100 INJECTION, SOLUTION INTRAVENOUS; SUBCUTANEOUS at 21:31

## 2024-10-26 RX ADMIN — PANTOPRAZOLE SODIUM 40 MG: 40 TABLET, DELAYED RELEASE ORAL at 05:32

## 2024-10-26 RX ADMIN — INSULIN LISPRO 1 UNITS: 100 INJECTION, SOLUTION INTRAVENOUS; SUBCUTANEOUS at 16:34

## 2024-10-26 RX ADMIN — ASPIRIN 81 MG 81 MG: 81 TABLET ORAL at 08:18

## 2024-10-26 RX ADMIN — ATORVASTATIN CALCIUM 40 MG: 40 TABLET, FILM COATED ORAL at 16:34

## 2024-10-26 RX ADMIN — SODIUM CHLORIDE 75 ML/HR: 0.9 INJECTION, SOLUTION INTRAVENOUS at 05:27

## 2024-10-26 RX ADMIN — OXYCODONE HYDROCHLORIDE 10 MG: 10 TABLET ORAL at 12:08

## 2024-10-26 RX ADMIN — OXYCODONE HYDROCHLORIDE 5 MG: 5 TABLET ORAL at 05:36

## 2024-10-26 NOTE — CASE MANAGEMENT
Case Management Progress Note    Patient name Ian Medina  Location /426-01 MRN [id number]  : 1945 Date 10/26/2024       LOS (days): 1  Geometric Mean LOS (GMLOS) (days): 2.6  Days to GMLOS:1.2        OBJECTIVE:        Current admission status: Inpatient  Preferred Pharmacy:   [facility] [address] [place], [place] [address] [facility]  [address] [facility] [address]  Phone: [phone] Fax: [phone]    Primary Care Provider: No primary care provider on file.    Primary Insurance: MEDICARE  Secondary Insurance: [employer]    PROGRESS NOTE:per provider pt agreeable to rehab. All rehab referrals pending in aidin.

## 2024-10-26 NOTE — ASSESSMENT & PLAN NOTE
Blood pressures appear somewhat soft for patient's age  Hold amlodipine-benazepril  Continue home beta-blocker

## 2024-10-26 NOTE — ASSESSMENT & PLAN NOTE
Lab Results   Component Value Date    HGBA1C 11.0 (H) 10/25/2024       Recent Labs     10/25/24  1035 10/25/24  1552 10/25/24  2027 10/26/24  0701   POCGLU 194* 296* 180* 191*       Blood Sugar Average: Last 72 hrs:  (P) 214.5  Poorly controlled on outpatient regimen  Sliding scale insulin with Accu-Cheks  Target blood glucose 140-180  Diabetic diet

## 2024-10-26 NOTE — PLAN OF CARE
Problem: PAIN - ADULT  Goal: Verbalizes/displays adequate comfort level or baseline comfort level  Description: Interventions:  - Encourage patient to monitor pain and request assistance  - Assess pain using appropriate pain scale (0-10 pain scale)  - Administer analgesics based on type and severity of pain and evaluate response  - Implement non-pharmacological measures as appropriate and evaluate response  - Consider cultural and social influences on pain and pain management  - Notify physician/advanced practitioner if interventions unsuccessful or patient reports new pain  Outcome: Progressing     Problem: SAFETY ADULT  Goal: Patient will remain free of falls  Description: INTERVENTIONS:  - Educate patient/family on patient safety including physical limitations  - Instruct patient to call for assistance with activity (max assist)  - Consult OT/PT to assist with strengthening/mobility   - Keep Call bell within reach  - Keep bed low and locked with side rails adjusted as appropriate  - Keep care items and personal belongings within reach  - Initiate and maintain comfort rounds  - Make Fall Risk Sign visible to staff (high fall risk)  - Offer Toileting every 1-2 Hours, in advance of need  - Initiate/Maintain bed/chair alarm  - Obtain necessary fall risk management equipment: nonskid footwear, gait belt  - Apply yellow socks and bracelet for high fall risk patients  - Consider moving patient to room near nurses station  Outcome: Progressing  Goal: Maintain or return to baseline ADL function  Description: INTERVENTIONS:  -  Assess patient's ability to carry out ADLs; (max assist)  - Assess/evaluate cause of self-care deficits (limited movement, pain, fracture, splint/sling)  - Assess range of motion  - Assess patient's mobility; (max assist)  - Assess patient's need for assistive devices and provide as appropriate  - Encourage maximum independence but intervene and supervise when necessary  - Involve family in  performance of ADLs  - Assess for home care needs following discharge   - Consider OT consult to assist with ADL evaluation and planning for discharge  - Provide patient education as appropriate  Outcome: Progressing  Goal: Maintains/Returns to pre admission functional level  Description: INTERVENTIONS:  - Perform AM-PAC 6 Click Basic Mobility/ Daily Activity assessment daily.  - Set and communicate daily mobility goal to care team and patient/family/caregiver.   - Collaborate with rehabilitation services on mobility goals if consulted  - Perform Range of Motion 3 times a day.  - Reposition patient every 3 hours.  - Dangle patient 3 times a day  - Stand patient 3 times a day  - Ambulate patient 3 times a day  - Out of bed to chair 3 times a day   - Out of bed for meals 3 times a day  - Out of bed for toileting  - Record patient progress and toleration of activity level   Outcome: Progressing     Problem: DISCHARGE PLANNING  Goal: Discharge to home or other facility with appropriate resources  Description: INTERVENTIONS:  - Identify barriers to discharge w/patient and caregiver  - Arrange for needed discharge resources and transportation as appropriate  - Identify discharge learning needs (meds, wound care, etc.)  - Refer to Case Management Department for coordinating discharge planning if the patient needs post-hospital services based on physician/advanced practitioner order or complex needs related to functional status, cognitive ability, or social support system  Outcome: Progressing     Problem: NEUROSENSORY - ADULT  Goal: Achieves maximal functionality and self care  Description: INTERVENTIONS  - Monitor swallowing and airway patency with patient fatigue and changes in neurological status  - Encourage and assist patient to increase activity and self care.   - Encourage visually impaired, hearing impaired and aphasic patients to use assistive/communication devices  Outcome: Progressing     Problem: METABOLIC,  FLUID AND ELECTROLYTES - ADULT  Goal: Electrolytes maintained within normal limits  Description: INTERVENTIONS:  - Monitor labs and assess patient for signs and symptoms of electrolyte imbalances  - Administer electrolyte replacement as ordered  - Monitor response to electrolyte replacements, including repeat lab results as appropriate  - Instruct patient on fluid and nutrition as appropriate  Outcome: Progressing  Goal: Fluid balance maintained  Description: INTERVENTIONS:  - Monitor labs   - Monitor I/O and WT  - Instruct patient on fluid and nutrition as appropriate  - Assess for signs & symptoms of volume excess or deficit  Outcome: Progressing  Goal: Glucose maintained within target range  Description: INTERVENTIONS:  - Monitor Blood Glucose as ordered  - Assess for signs and symptoms of hyperglycemia and hypoglycemia  - Administer ordered medications to maintain glucose within target range  - Assess nutritional intake and initiate nutrition service referral as needed  Outcome: Progressing     Problem: MUSCULOSKELETAL - ADULT  Goal: Maintain or return mobility to safest level of function  Description: INTERVENTIONS:  - Assess patient's ability to carry out ADLs; (max assist)  - Assess/evaluate cause of self-care deficits (limited movement, pain, fracture, splint/sling)  - Assess range of motion  - Assess patient's mobility (max assist)  - Assess patient's need for assistive devices and provide as appropriate  - Encourage maximum independence but intervene and supervise when necessary  - Involve family in performance of ADLs  - Assess for home care needs following discharge   - Consider OT consult to assist with ADL evaluation and planning for discharge  - Provide patient education as appropriate  Outcome: Progressing  Goal: Maintain proper alignment of affected body part  Description: INTERVENTIONS:  - Support, maintain and protect limb and body alignment  - Provide patient/ family with appropriate  education  Outcome: Progressing     Problem: Prexisting or High Potential for Compromised Skin Integrity  Goal: Skin integrity is maintained or improved  Description: INTERVENTIONS:  - Identify patients at risk for skin breakdown  - Assess and monitor skin integrity  - Assess and monitor nutrition and hydration status  - Monitor labs   - Assess for incontinence (every 1-2 hours prn)  - Turn and reposition patient (every 2 hours and prn, cue to weight shift and assist with turning)  - Assist with mobility/ambulation (max assist)  - Relieve pressure over bony prominences  - Avoid friction and shearing  - Provide appropriate hygiene as needed including keeping skin clean and dry  - Evaluate need for skin moisturizer/barrier cream  - Collaborate with interdisciplinary team   - Patient/family teaching  - Consider wound care consult   Outcome: Progressing     Problem: INFECTION - ADULT  Goal: Absence or prevention of progression during hospitalization  Description: INTERVENTIONS:  - Assess and monitor for signs and symptoms of infection  - Monitor lab/diagnostic results  - Monitor all insertion sites, i.e. indwelling lines  - Administer medications as ordered  - Instruct and encourage patient and family to use good hand hygiene technique  Outcome: Progressing     Problem: Knowledge Deficit  Goal: Patient/family/caregiver demonstrates understanding of disease process, treatment plan, medications, and discharge instructions  Description: Complete learning assessment and assess knowledge base.  Interventions:  - Provide teaching at level of understanding  - Provide teaching via preferred learning methods  Outcome: Progressing

## 2024-10-26 NOTE — ASSESSMENT & PLAN NOTE
Patient with fall 4 days ago  Lives at home alone  Imaging revealed right and left humeral head fractures as well as right middle finger and thumb fractures  Right arm/hand splinted and placed in a sling in the ED  Orthopedic surgery consultation appreciated  No surgical intervention indicated  Can continue sling for prox humeral fx, no lifting  Outpatient follow-up in 2 weeks to start PT  Needs f/u with Dr Jesus Mehta for further management of his hand fx  Pain regimen as ordered  PT/OT evaluation and treatment: level I, patient agreeable to rehab  Case management consultation for safe disposition planning  Fall precautions

## 2024-10-26 NOTE — PROGRESS NOTES
Progress Note - Hospitalist   Name: Ian Medina 78 y.o. male I MRN: [id number]  Unit/Bed#: 426-01 I Date of Admission: 10/24/2024   Date of Service: 10/26/2024 I Hospital Day: 1    Assessment & Plan  Fractures involving multiple body regions  Patient with fall 4 days ago  Lives at home alone  Imaging revealed right and left humeral head fractures as well as right middle finger and thumb fractures  Right arm/hand splinted and placed in a sling in the ED  Orthopedic surgery consultation appreciated  No surgical intervention indicated  Can continue sling for prox humeral fx, no lifting  Outpatient follow-up in 2 weeks to start PT  Needs f/u with Dr Jesus Mehta for further management of his hand fx  Pain regimen as ordered  PT/OT evaluation and treatment: level I, patient agreeable to rehab  Case management consultation for safe disposition planning  Fall precautions  Type 2 diabetes mellitus (HCC)  Lab Results   Component Value Date    HGBA1C 11.0 (H) 10/25/2024       Recent Labs     10/25/24  1035 10/25/24  1552 10/25/24  2027 10/26/24  0701   POCGLU 194* 296* 180* 191*       Blood Sugar Average: Last 72 hrs:  (P) 214.5  Poorly controlled on outpatient regimen  Sliding scale insulin with Accu-Cheks  Target blood glucose 140-180  Diabetic diet    Primary hypertension  Blood pressures appear somewhat soft for patient's age  Hold amlodipine-benazepril  Continue home beta-blocker    Major depressive disorder  Mood appears appropriate  Continue home Celexa  GERD (gastroesophageal reflux disease)  Stable and chronic in nature  Continue home PPI  Closed fracture of right proximal humerus    Closed displaced fracture of neck of third metacarpal bone of right hand      VTE Pharmacologic Prophylaxis: VTE Score: 8 heparin    Mobility:   Basic Mobility Inpatient Raw Score: 12  JH-HLM Goal: 4: Move to chair/commode  JH-HLM Achieved: 1: Laying in bed  JH-HLM Goal NOT achieved. Continue with multidisciplinary rounding and  encourage appropriate mobility to improve upon [facility] goals.    Patient Centered Rounds: I performed bedside rounds with nursing staff today.   Discussions with Specialists or Other Care Team Provider: case management    Education and Discussions with Family / Patient: to call son    Current Length of Stay: 1 day(s)  Current Patient Status: Inpatient   Certification Statement: The patient will continue to require additional inpatient hospital stay due to pain control, safe discharge planning  Discharge Plan: Anticipate discharge in 24-48 hrs to rehab facility.    Code Status: Level 1 - Full Code    Subjective   Patient seen and examined. Denies any acute concerns. No chest pain, SOB, abdominal pain, N/V/D. Had some brief arm paresthesias, but stated they stopped    Objective :  Temp:  [97.3 °F (36.3 °C)-98.5 °F (36.9 °C)] 97.3 °F (36.3 °C)  HR:  [71-83] 83  BP: (112-124)/(58-67) 119/58  Resp:  [17] 17  SpO2:  [95 %-99 %] 95 %  O2 Device: None (Room air)    Body mass index is 28.13 kg/m².     Input and Output Summary (last 24 hours):     Intake/Output Summary (Last 24 hours) at 10/26/2024 1054  Last data filed at 10/26/2024 0828  Gross per 24 hour   Intake 180 ml   Output 1650 ml   Net -1470 ml       Physical Exam  HENT:      Head: Normocephalic and atraumatic.   Eyes:      Comments: Ecchymosis    Cardiovascular:      Rate and Rhythm: Normal rate and regular rhythm.   Pulmonary:      Effort: Pulmonary effort is normal. No respiratory distress.      Breath sounds: Normal breath sounds. No wheezing.   Abdominal:      General: Abdomen is flat. Bowel sounds are normal. There is no distension.      Palpations: Abdomen is soft.   Musculoskeletal:         General: Signs of injury present.      Comments: RUE in sling, capillary refill normal, able to wiggle fingers   Skin:     General: Skin is warm and dry.   Neurological:      General: No focal deficit present.      Mental Status: He is alert.      Comments: Oriented to  person, place, month, not year           Lines/Drains:              Lab Results: I have reviewed the following results:   Results from last 7 days   Lab Units 10/26/24  0414   WBC Thousand/uL 6.46   HEMOGLOBIN g/dL 12.3   HEMATOCRIT % 37.7   PLATELETS Thousands/uL 217   SEGS PCT % 49   LYMPHO PCT % 33   MONO PCT % 9   EOS PCT % 7*     Results from last 7 days   Lab Units 10/26/24  0414 10/25/24  0539 10/24/24  2111   SODIUM mmol/L 136   < > 134*   POTASSIUM mmol/L 4.5   < > 4.9   CHLORIDE mmol/L 106   < > 100   CO2 mmol/L 21   < > 19*   BUN mg/dL 30*   < > 35*   CREATININE mg/dL 1.35*   < > 1.48*   ANION GAP mmol/L 9   < > 15*   CALCIUM mg/dL 9.0   < > 9.8   ALBUMIN g/dL  --   --  4.1   TOTAL BILIRUBIN mg/dL  --   --  0.98   ALK PHOS U/L  --   --  80   ALT U/L  --   --  17   AST U/L  --   --  18   GLUCOSE RANDOM mg/dL 145*   < > 274*    < > = values in this interval not displayed.     Results from last 7 days   Lab Units 10/24/24  2111   INR  1.05     Results from last 7 days   Lab Units 10/26/24  0701 10/25/24  2027 10/25/24  1552 10/25/24  1035 10/25/24  0659 10/25/24  0223   POC GLUCOSE mg/dl 191* 180* 296* 194* 209* 217*     Results from last 7 days   Lab Units 10/25/24  0547   HEMOGLOBIN A1C % 11.0*           Recent Cultures (last 7 days):         Imaging Results Review: I reviewed radiology reports from this admission including: CT chest, CT abdomen/pelvis, CT head, CT C-spine, and xray(s).  Other Study Results Review: No additional pertinent studies reviewed.    Last 24 Hours Medication List:     Current Facility-Administered Medications:     acetaminophen (TYLENOL) tablet 650 mg, Q4H PRN    aspirin chewable tablet 81 mg, Daily    atorvastatin (LIPITOR) tablet 40 mg, Daily With Dinner    citalopram (CeleXA) tablet 40 mg, Daily    heparin (porcine) subcutaneous injection 5,000 Units, Q8H SANDY **AND** [CANCELED] Platelet count, Once    HYDROmorphone (DILAUDID) injection 0.5 mg, Q6H PRN    insulin lispro  (HumALOG/ADMELOG) 100 units/mL subcutaneous injection 1-6 Units, TID AC **AND** Fingerstick Glucose (POCT), TID AC    insulin lispro (HumALOG/ADMELOG) 100 units/mL subcutaneous injection 1-6 Units, HS    metoprolol succinate (TOPROL-XL) 24 hr tablet 25 mg, Daily    ondansetron (ZOFRAN) injection 4 mg, Q6H PRN    oxyCODONE (ROXICODONE) immediate release tablet 10 mg, Q6H PRN    oxyCODONE (ROXICODONE) IR tablet 5 mg, Q6H PRN    pantoprazole (PROTONIX) EC tablet 40 mg, Early Morning    sodium chloride 0.9 % infusion, Continuous, Last Rate: 75 mL/hr (10/26/24 0527)    Administrative Statements   Today, Patient Was Seen By: Ange Ambrosio PA-C      **Please Note: This note may have been constructed using a voice recognition system.**

## 2024-10-26 NOTE — PLAN OF CARE
Problem: PAIN - ADULT  Goal: Verbalizes/displays adequate comfort level or baseline comfort level  Description: Interventions:  - Encourage patient to monitor pain and request assistance  - Assess pain using appropriate pain scale  - Administer analgesics based on type and severity of pain and evaluate response  - Implement non-pharmacological measures as appropriate and evaluate response  - Consider cultural and social influences on pain and pain management  - Notify physician/advanced practitioner if interventions unsuccessful or patient reports new pain  Outcome: Progressing     Problem: SAFETY ADULT  Goal: Patient will remain free of falls  Description: INTERVENTIONS:  - Educate patient/family on patient safety including physical limitations  - Instruct patient to call for assistance with activity   - Consult OT/PT to assist with strengthening/mobility   - Keep Call bell within reach  - Keep bed low and locked with side rails adjusted as appropriate  - Keep care items and personal belongings within reach  - Initiate and maintain comfort rounds  - Make Fall Risk Sign visible to staff  - Offer Toileting every 2 Hours, in advance of need  - Initiate/Maintain bed/chair alarm  - Obtain necessary fall risk management equipment: alarms  - Apply yellow socks and bracelet for high fall risk patients  - Consider moving patient to room near nurses station  Outcome: Progressing  Goal: Maintain or return to baseline ADL function  Description: INTERVENTIONS:  -  Assess patient's ability to carry out ADLs; assess patient's baseline for ADL function and identify physical deficits which impact ability to perform ADLs (bathing, care of mouth/teeth, toileting, grooming, dressing, etc.)  - Assess/evaluate cause of self-care deficits   - Assess range of motion  - Assess patient's mobility; develop plan if impaired  - Assess patient's need for assistive devices and provide as appropriate  - Encourage maximum independence but  intervene and supervise when necessary  - Involve family in performance of ADLs  - Assess for home care needs following discharge   - Consider OT consult to assist with ADL evaluation and planning for discharge  - Provide patient education as appropriate  Outcome: Progressing  Goal: Maintains/Returns to pre admission functional level  Description: INTERVENTIONS:  - Perform AM-PAC 6 Click Basic Mobility/ Daily Activity assessment daily.  - Set and communicate daily mobility goal to care team and patient/family/caregiver.   - Collaborate with rehabilitation services on mobility goals if consulted  - Perform Range of Motion 3 times a day.  - Reposition patient every 3 hours.  - Dangle patient 3 times a day  - Stand patient 3 times a day  - Ambulate patient 3 times a day  - Out of bed to chair 3 times a day   - Out of bed for meals 3 times a day  - Out of bed for toileting  - Record patient progress and toleration of activity level   Outcome: Progressing     Problem: DISCHARGE PLANNING  Goal: Discharge to home or other facility with appropriate resources  Description: INTERVENTIONS:  - Identify barriers to discharge w/patient and caregiver  - Arrange for needed discharge resources and transportation as appropriate  - Identify discharge learning needs (meds, wound care, etc.)  - Arrange for interpretive services to assist at discharge as needed  - Refer to Case Management Department for coordinating discharge planning if the patient needs post-hospital services based on physician/advanced practitioner order or complex needs related to functional status, cognitive ability, or social support system  Outcome: Progressing     Problem: NEUROSENSORY - ADULT  Goal: Achieves maximal functionality and self care  Description: INTERVENTIONS  - Monitor swallowing and airway patency with patient fatigue and changes in neurological status  - Encourage and assist patient to increase activity and self care.   - Encourage visually  impaired, hearing impaired and aphasic patients to use assistive/communication devices  Outcome: Progressing     Problem: METABOLIC, FLUID AND ELECTROLYTES - ADULT  Goal: Electrolytes maintained within normal limits  Description: INTERVENTIONS:  - Monitor labs and assess patient for signs and symptoms of electrolyte imbalances  - Administer electrolyte replacement as ordered  - Monitor response to electrolyte replacements, including repeat lab results as appropriate  - Instruct patient on fluid and nutrition as appropriate  Outcome: Progressing  Goal: Fluid balance maintained  Description: INTERVENTIONS:  - Monitor labs   - Monitor I/O and WT  - Instruct patient on fluid and nutrition as appropriate  - Assess for signs & symptoms of volume excess or deficit  Outcome: Progressing  Goal: Glucose maintained within target range  Description: INTERVENTIONS:  - Monitor Blood Glucose as ordered  - Assess for signs and symptoms of hyperglycemia and hypoglycemia  - Administer ordered medications to maintain glucose within target range  - Assess nutritional intake and initiate nutrition service referral as needed  Outcome: Progressing     Problem: MUSCULOSKELETAL - ADULT  Goal: Maintain or return mobility to safest level of function  Description: INTERVENTIONS:  - Assess patient's ability to carry out ADLs; assess patient's baseline for ADL function and identify physical deficits which impact ability to perform ADLs (bathing, care of mouth/teeth, toileting, grooming, dressing, etc.)  - Assess/evaluate cause of self-care deficits   - Assess range of motion  - Assess patient's mobility  - Assess patient's need for assistive devices and provide as appropriate  - Encourage maximum independence but intervene and supervise when necessary  - Involve family in performance of ADLs  - Assess for home care needs following discharge   - Consider OT consult to assist with ADL evaluation and planning for discharge  - Provide patient  education as appropriate  Outcome: Progressing  Goal: Maintain proper alignment of affected body part  Description: INTERVENTIONS:  - Support, maintain and protect limb and body alignment  - Provide patient/ family with appropriate education  Outcome: Progressing     Problem: Prexisting or High Potential for Compromised Skin Integrity  Goal: Skin integrity is maintained or improved  Description: INTERVENTIONS:  - Identify patients at risk for skin breakdown  - Assess and monitor skin integrity  - Assess and monitor nutrition and hydration status  - Monitor labs   - Assess for incontinence   - Turn and reposition patient  - Assist with mobility/ambulation  - Relieve pressure over bony prominences  - Avoid friction and shearing  - Provide appropriate hygiene as needed including keeping skin clean and dry  - Evaluate need for skin moisturizer/barrier cream  - Collaborate with interdisciplinary team   - Patient/family teaching  - Consider wound care consult   Outcome: Progressing

## 2024-10-27 PROBLEM — G93.41 METABOLIC ENCEPHALOPATHY: Status: ACTIVE | Noted: 2024-10-27

## 2024-10-27 LAB
ANION GAP SERPL CALCULATED.3IONS-SCNC: 10 MMOL/L (ref 4–13)
BASOPHILS # BLD AUTO: 0.04 THOUSANDS/ΜL (ref 0–0.1)
BASOPHILS NFR BLD AUTO: 1 % (ref 0–1)
BILIRUB UR QL STRIP: NEGATIVE
BUN SERPL-MCNC: 26 MG/DL (ref 5–25)
CALCIUM SERPL-MCNC: 9.3 MG/DL (ref 8.4–10.2)
CHLORIDE SERPL-SCNC: 103 MMOL/L (ref 96–108)
CLARITY UR: CLEAR
CO2 SERPL-SCNC: 22 MMOL/L (ref 21–32)
COLOR UR: ABNORMAL
CREAT SERPL-MCNC: 1.32 MG/DL (ref 0.6–1.3)
EOSINOPHIL # BLD AUTO: 0.4 THOUSAND/ΜL (ref 0–0.61)
EOSINOPHIL NFR BLD AUTO: 7 % (ref 0–6)
ERYTHROCYTE [DISTWIDTH] IN BLOOD BY AUTOMATED COUNT: 12.7 % (ref 11.6–15.1)
GFR SERPL CREATININE-BSD FRML MDRD: 51 ML/MIN/1.73SQ M
GLUCOSE SERPL-MCNC: 157 MG/DL (ref 65–140)
GLUCOSE SERPL-MCNC: 176 MG/DL (ref 65–140)
GLUCOSE SERPL-MCNC: 187 MG/DL (ref 65–140)
GLUCOSE SERPL-MCNC: 210 MG/DL (ref 65–140)
GLUCOSE SERPL-MCNC: 211 MG/DL (ref 65–140)
GLUCOSE UR STRIP-MCNC: ABNORMAL MG/DL
HCT VFR BLD AUTO: 36.9 % (ref 36.5–49.3)
HGB BLD-MCNC: 11.9 G/DL (ref 12–17)
HGB UR QL STRIP.AUTO: NEGATIVE
IMM GRANULOCYTES # BLD AUTO: 0.08 THOUSAND/UL (ref 0–0.2)
IMM GRANULOCYTES NFR BLD AUTO: 1 % (ref 0–2)
KETONES UR STRIP-MCNC: NEGATIVE MG/DL
LEUKOCYTE ESTERASE UR QL STRIP: NEGATIVE
LYMPHOCYTES # BLD AUTO: 1.79 THOUSANDS/ΜL (ref 0.6–4.47)
LYMPHOCYTES NFR BLD AUTO: 29 % (ref 14–44)
MAGNESIUM SERPL-MCNC: 1.9 MG/DL (ref 1.9–2.7)
MCH RBC QN AUTO: 29 PG (ref 26.8–34.3)
MCHC RBC AUTO-ENTMCNC: 32.2 G/DL (ref 31.4–37.4)
MCV RBC AUTO: 90 FL (ref 82–98)
MONOCYTES # BLD AUTO: 0.54 THOUSAND/ΜL (ref 0.17–1.22)
MONOCYTES NFR BLD AUTO: 9 % (ref 4–12)
NEUTROPHILS # BLD AUTO: 3.32 THOUSANDS/ΜL (ref 1.85–7.62)
NEUTS SEG NFR BLD AUTO: 53 % (ref 43–75)
NITRITE UR QL STRIP: NEGATIVE
NRBC BLD AUTO-RTO: 0 /100 WBCS
PH UR STRIP.AUTO: 5 [PH]
PHOSPHATE SERPL-MCNC: 2.7 MG/DL (ref 2.3–4.1)
PLATELET # BLD AUTO: 220 THOUSANDS/UL (ref 149–390)
PMV BLD AUTO: 10.5 FL (ref 8.9–12.7)
POTASSIUM SERPL-SCNC: 4.4 MMOL/L (ref 3.5–5.3)
PROT UR STRIP-MCNC: NEGATIVE MG/DL
RBC # BLD AUTO: 4.1 MILLION/UL (ref 3.88–5.62)
SODIUM SERPL-SCNC: 135 MMOL/L (ref 135–147)
SP GR UR STRIP.AUTO: 1.01 (ref 1–1.03)
UROBILINOGEN UR STRIP-ACNC: <2 MG/DL
WBC # BLD AUTO: 6.17 THOUSAND/UL (ref 4.31–10.16)

## 2024-10-27 PROCEDURE — 85025 COMPLETE CBC W/AUTO DIFF WBC: CPT | Performed by: INTERNAL MEDICINE

## 2024-10-27 PROCEDURE — 82948 REAGENT STRIP/BLOOD GLUCOSE: CPT

## 2024-10-27 PROCEDURE — 84100 ASSAY OF PHOSPHORUS: CPT | Performed by: INTERNAL MEDICINE

## 2024-10-27 PROCEDURE — 83735 ASSAY OF MAGNESIUM: CPT | Performed by: INTERNAL MEDICINE

## 2024-10-27 PROCEDURE — 80048 BASIC METABOLIC PNL TOTAL CA: CPT | Performed by: INTERNAL MEDICINE

## 2024-10-27 PROCEDURE — 99232 SBSQ HOSP IP/OBS MODERATE 35: CPT

## 2024-10-27 RX ORDER — POLYETHYLENE GLYCOL 3350 17 G/17G
17 POWDER, FOR SOLUTION ORAL DAILY PRN
Status: DISCONTINUED | OUTPATIENT
Start: 2024-10-27 | End: 2024-10-28 | Stop reason: HOSPADM

## 2024-10-27 RX ADMIN — INSULIN LISPRO 2 UNITS: 100 INJECTION, SOLUTION INTRAVENOUS; SUBCUTANEOUS at 21:12

## 2024-10-27 RX ADMIN — METOPROLOL SUCCINATE 25 MG: 25 TABLET, EXTENDED RELEASE ORAL at 08:10

## 2024-10-27 RX ADMIN — HEPARIN SODIUM 5000 UNITS: 5000 INJECTION, SOLUTION INTRAVENOUS; SUBCUTANEOUS at 21:12

## 2024-10-27 RX ADMIN — OXYCODONE HYDROCHLORIDE 10 MG: 10 TABLET ORAL at 08:10

## 2024-10-27 RX ADMIN — HEPARIN SODIUM 5000 UNITS: 5000 INJECTION, SOLUTION INTRAVENOUS; SUBCUTANEOUS at 15:05

## 2024-10-27 RX ADMIN — INSULIN LISPRO 1 UNITS: 100 INJECTION, SOLUTION INTRAVENOUS; SUBCUTANEOUS at 12:08

## 2024-10-27 RX ADMIN — CITALOPRAM HYDROBROMIDE 40 MG: 20 TABLET ORAL at 08:10

## 2024-10-27 RX ADMIN — PANTOPRAZOLE SODIUM 40 MG: 40 TABLET, DELAYED RELEASE ORAL at 05:39

## 2024-10-27 RX ADMIN — HEPARIN SODIUM 5000 UNITS: 5000 INJECTION, SOLUTION INTRAVENOUS; SUBCUTANEOUS at 05:38

## 2024-10-27 RX ADMIN — INSULIN LISPRO 2 UNITS: 100 INJECTION, SOLUTION INTRAVENOUS; SUBCUTANEOUS at 17:03

## 2024-10-27 RX ADMIN — ASPIRIN 81 MG 81 MG: 81 TABLET ORAL at 08:10

## 2024-10-27 RX ADMIN — ATORVASTATIN CALCIUM 40 MG: 40 TABLET, FILM COATED ORAL at 17:03

## 2024-10-27 RX ADMIN — INSULIN LISPRO 1 UNITS: 100 INJECTION, SOLUTION INTRAVENOUS; SUBCUTANEOUS at 08:10

## 2024-10-27 NOTE — ASSESSMENT & PLAN NOTE
Patient with fall 4 days PTA  Lives at home alone  Imaging revealed right and left humeral head fractures as well as right middle finger and thumb fractures  Right arm/hand splinted and placed in a sling in the ED  Orthopedic surgery consultation appreciated  No surgical intervention indicated  Can continue sling for prox humeral fx, no lifting  Outpatient follow-up in 2 weeks to start PT  Needs f/u with Dr Jesus Mehta for further management of his hand fx  Pain regimen as ordered  PT/OT evaluation and treatment: level I, patient agreeable to rehab  Case management consultation for safe disposition planning  Fall precautions

## 2024-10-27 NOTE — ASSESSMENT & PLAN NOTE
Lab Results   Component Value Date    HGBA1C 11.0 (H) 10/25/2024       Recent Labs     10/26/24  1552 10/26/24  2103 10/27/24  0704 10/27/24  1051   POCGLU 181* 184* 176* 187*       Blood Sugar Average: Last 72 hrs:  (P) [id number]  Poorly controlled on outpatient regimen  Sliding scale insulin with Accu-Cheks  Target blood glucose 140-180  Diabetic diet

## 2024-10-27 NOTE — ASSESSMENT & PLAN NOTE
Intermittent throughout hospitalization  Patient seen today alert & oriented to person, place, situation, year not month  Son does report some periods of intermittent forgetfulness at home but is typically A&O x 4.   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

## 2024-10-27 NOTE — PROGRESS NOTES
Progress Note - Hospitalist   Name: Ian Medina 78 y.o. male I MRN: [id number]  Unit/Bed#: 426-01 I Date of Admission: 10/24/2024   Date of Service: 10/27/2024 I Hospital Day: 2    Assessment & Plan  Fractures involving multiple body regions  Patient with fall 4 days PTA  Lives at home alone  Imaging revealed right and left humeral head fractures as well as right middle finger and thumb fractures  Right arm/hand splinted and placed in a sling in the ED  Orthopedic surgery consultation appreciated  No surgical intervention indicated  Can continue sling for prox humeral fx, no lifting  Outpatient follow-up in 2 weeks to start PT  Needs f/u with Dr Jesus Mehta for further management of his hand fx  Pain regimen as ordered  PT/OT evaluation and treatment: level I, patient agreeable to rehab  Case management consultation for safe disposition planning  Fall precautions  Type 2 diabetes mellitus (HCC)  Lab Results   Component Value Date    HGBA1C 11.0 (H) 10/25/2024       Recent Labs     10/26/24  1552 10/26/24  2103 10/27/24  0704 10/27/24  1051   POCGLU 181* 184* 176* 187*       Blood Sugar Average: Last 72 hrs:  (P) [id number]  Poorly controlled on outpatient regimen  Sliding scale insulin with Accu-Cheks  Target blood glucose 140-180  Diabetic diet    Primary hypertension  Blood pressures appear somewhat soft for patient's age  Hold amlodipine-benazepril  Continue home beta-blocker    Major depressive disorder  Mood appears appropriate  Continue home Celexa  GERD (gastroesophageal reflux disease)  Stable and chronic in nature  Continue home PPI  Closed fracture of right proximal humerus    Closed displaced fracture of neck of third metacarpal bone of right hand    Metabolic encephalopathy  Intermittent throughout hospitalization  Patient seen today alert & oriented to person, place, situation, year not month  Son does report some periods of intermittent forgetfulness at home but is typically A&O x 4.    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 Continue with multidisciplinary rounding and encourage appropriate mobility to improve upon JH-HLM goals.    Patient Centered Rounds: I performed bedside rounds with nursing staff today.   Discussions with Specialists or Other Care Team Provider:     Education and Discussions with Family / Patient: Updated  (son) via phone.    Current Length of Stay: 2 day(s)  Current Patient Status: Inpatient   Certification Statement: The patient will continue to require additional inpatient hospital stay due to pain control, safe discharge planning  Discharge Plan: Anticipate discharge in 24-48 hrs to rehab facility.    Code Status: Level 1 - Full Code    Subjective   Patient seen and examined while eating lunch. Reports some mild discomfort of his right arm. No numbness. No SOB or chest pain. No abdominal pain, N/V/D. Did not have a bowel movement    Objective :  Temp:  [97.6 °F (36.4 °C)-98.7 °F (37.1 °C)] 97.6 °F (36.4 °C)  HR:  [72-81] 81  BP: (113-119)/(57-60) 119/57  Resp:  [17-19] 17  SpO2:  [94 %-97 %] 97 %  O2 Device: None (Room air)    Body mass index is 28.36 kg/m².     Input and Output Summary (last 24 hours):     Intake/Output Summary (Last 24 hours) at 10/27/2024 1355  Last data filed at 10/27/2024 1223  Gross per 24 hour   Intake 1655 ml   Output 2900 ml   Net -1245 ml       Physical Exam  HENT:      Head:  Normocephalic and atraumatic.   Cardiovascular:      Rate and Rhythm: Normal rate and regular rhythm.   Pulmonary:      Effort: Pulmonary effort is normal. No respiratory distress.      Breath sounds: Normal breath sounds.   Abdominal:      General: Abdomen is flat. Bowel sounds are normal.      Palpations: Abdomen is soft.      Tenderness: There is no guarding.   Musculoskeletal:      Comments: Right arm in sling  Normal cap refill, hand warm    Skin:     General: Skin is warm and dry.      Capillary Refill: Capillary refill takes less than 2 seconds.   Neurological:      General: No focal deficit present.      Mental Status: He is alert.      Cranial Nerves: Cranial nerves 2-12 are intact. No dysarthria or facial asymmetry.      Motor: Weakness present.      Comments: Oriented to person, place, situation, year not able to recall the month  RUE limited in sling  RLE 4/5, chronic with prior CVA  LUE 5/5  LLE 5/5           Lines/Drains:              Lab Results: I have reviewed the following results:   Results from last 7 days   Lab Units 10/27/24  0414   WBC Thousand/uL 6.17   HEMOGLOBIN g/dL 11.9*   HEMATOCRIT % 36.9   PLATELETS Thousands/uL 220   SEGS PCT % 53   LYMPHO PCT % 29   MONO PCT % 9   EOS PCT % 7*     Results from last 7 days   Lab Units 10/27/24  0414 10/25/24  0539 10/24/24  2111   SODIUM mmol/L 135   < > 134*   POTASSIUM mmol/L 4.4   < > 4.9   CHLORIDE mmol/L 103   < > 100   CO2 mmol/L 22   < > 19*   BUN mg/dL 26*   < > 35*   CREATININE mg/dL 1.32*   < > 1.48*   ANION GAP mmol/L 10   < > 15*   CALCIUM mg/dL 9.3   < > 9.8   ALBUMIN g/dL  --   --  4.1   TOTAL BILIRUBIN mg/dL  --   --  0.98   ALK PHOS U/L  --   --  80   ALT U/L  --   --  17   AST U/L  --   --  18   GLUCOSE RANDOM mg/dL 157*   < > 274*    < > = values in this interval not displayed.     Results from last 7 days   Lab Units 10/24/24  2111   INR  1.05     Results from last 7 days   Lab Units 10/27/24  1051 10/27/24  0704 10/26/24  2103  10/26/24  1552 10/26/24  1057 10/26/24  0701 10/25/24  2027 10/25/24  1552 10/25/24  1035 10/25/24  0659 10/25/24  0223   POC GLUCOSE mg/dl 187* 176* 184* 181* 224* 191* 180* 296* 194* 209* 217*     Results from last 7 days   Lab Units 10/25/24  0547   HEMOGLOBIN A1C % 11.0*           Recent Cultures (last 7 days):         Imaging Results Review: I reviewed radiology reports from this admission including: CT chest, CT abdomen/pelvis, CT head, CT C-spine, and xray(s).  Other Study Results Review: No additional pertinent studies reviewed.    Last 24 Hours Medication List:     Current Facility-Administered Medications:     acetaminophen (TYLENOL) tablet 650 mg, Q4H PRN    aspirin chewable tablet 81 mg, Daily    atorvastatin (LIPITOR) tablet 40 mg, Daily With Dinner    citalopram (CeleXA) tablet 40 mg, Daily    heparin (porcine) subcutaneous injection 5,000 Units, Q8H SANDY **AND** [CANCELED] Platelet count, Once    HYDROmorphone (DILAUDID) injection 0.5 mg, Q6H PRN    insulin lispro (HumALOG/ADMELOG) 100 units/mL subcutaneous injection 1-6 Units, TID AC **AND** Fingerstick Glucose (POCT), TID AC    insulin lispro (HumALOG/ADMELOG) 100 units/mL subcutaneous injection 1-6 Units, HS    metoprolol succinate (TOPROL-XL) 24 hr tablet 25 mg, Daily    ondansetron (ZOFRAN) injection 4 mg, Q6H PRN    oxyCODONE (ROXICODONE) immediate release tablet 10 mg, Q6H PRN    oxyCODONE (ROXICODONE) IR tablet 5 mg, Q6H PRN    pantoprazole (PROTONIX) EC tablet 40 mg, Early Morning    polyethylene glycol (MIRALAX) packet 17 g, Daily PRN    Administrative Statements   Today, Patient Was Seen By: Ange Ambrosio PA-C      **Please Note: This note may have been constructed using a voice recognition system.**

## 2024-10-28 VITALS
RESPIRATION RATE: 18 BRPM | BODY MASS INDEX: 28.51 KG/M2 | SYSTOLIC BLOOD PRESSURE: 114 MMHG | WEIGHT: 192.46 LBS | HEART RATE: 81 BPM | DIASTOLIC BLOOD PRESSURE: 65 MMHG | HEIGHT: 69 IN | TEMPERATURE: 98.9 F | OXYGEN SATURATION: 98 %

## 2024-10-28 LAB
GLUCOSE SERPL-MCNC: 174 MG/DL (ref 65–140)
GLUCOSE SERPL-MCNC: 211 MG/DL (ref 65–140)
GLUCOSE SERPL-MCNC: 231 MG/DL (ref 65–140)

## 2024-10-28 PROCEDURE — 82948 REAGENT STRIP/BLOOD GLUCOSE: CPT

## 2024-10-28 PROCEDURE — 97530 THERAPEUTIC ACTIVITIES: CPT

## 2024-10-28 PROCEDURE — NC001 PR NO CHARGE: Performed by: FAMILY MEDICINE

## 2024-10-28 PROCEDURE — 97116 GAIT TRAINING THERAPY: CPT

## 2024-10-28 PROCEDURE — 99239 HOSP IP/OBS DSCHRG MGMT >30: CPT | Performed by: FAMILY MEDICINE

## 2024-10-28 PROCEDURE — 97535 SELF CARE MNGMENT TRAINING: CPT

## 2024-10-28 RX ADMIN — INSULIN LISPRO 3 UNITS: 100 INJECTION, SOLUTION INTRAVENOUS; SUBCUTANEOUS at 12:00

## 2024-10-28 RX ADMIN — ASPIRIN 81 MG 81 MG: 81 TABLET ORAL at 08:14

## 2024-10-28 RX ADMIN — INSULIN LISPRO 1 UNITS: 100 INJECTION, SOLUTION INTRAVENOUS; SUBCUTANEOUS at 08:00

## 2024-10-28 RX ADMIN — INSULIN LISPRO 2 UNITS: 100 INJECTION, SOLUTION INTRAVENOUS; SUBCUTANEOUS at 16:43

## 2024-10-28 RX ADMIN — ATORVASTATIN CALCIUM 40 MG: 40 TABLET, FILM COATED ORAL at 16:17

## 2024-10-28 RX ADMIN — HEPARIN SODIUM 5000 UNITS: 5000 INJECTION, SOLUTION INTRAVENOUS; SUBCUTANEOUS at 14:09

## 2024-10-28 RX ADMIN — HEPARIN SODIUM 5000 UNITS: 5000 INJECTION, SOLUTION INTRAVENOUS; SUBCUTANEOUS at 05:26

## 2024-10-28 RX ADMIN — CITALOPRAM HYDROBROMIDE 40 MG: 20 TABLET ORAL at 08:14

## 2024-10-28 RX ADMIN — PANTOPRAZOLE SODIUM 40 MG: 40 TABLET, DELAYED RELEASE ORAL at 05:26

## 2024-10-28 NOTE — PLAN OF CARE
Problem: OCCUPATIONAL THERAPY ADULT  Goal: Performs self-care activities at highest level of function for planned discharge setting.  See evaluation for individualized goals.  Description: Treatment Interventions: ADL retraining, Functional transfer training, UE strengthening/ROM, Endurance training, Cognitive reorientation, Patient/family training, Equipment evaluation/education, Activityengagement, Compensatory technique education          See flowsheet documentation for full assessment, interventions and recommendations.   Outcome: Progressing  Note: Limitation: Decreased ADL status, Decreased UE strength, Decreased UE ROM, Decreased Safe judgement during ADL, Decreased cognition, Decreased endurance, Decreased self-care trans, Decreased high-level ADLs, Decreased fine motor control     Assessment: Patient participated in Skilled OT session this date with interventions consisting of ADL re training with the use of correct body mechnaics, safety awareness and fall prevention techniques, one handed dressing technique,  therapeutic activities to: increase activity tolerance, increase standing tolerance time with unilateral UE support to complete sink level ADLs, increase dynamic sit/ stand balance during functional activity , increase postural control, increase trunk control, and increase OOB/ sitting tolerance . Co treatment with PT secondary to complex medical condition of pt, mod-max A of 2 required to achieve and maintain transitional movements, requiring the need of skilled therapeutic intervention of 2 therapists to achieve delivery of services. Patient agreeable to OT treatment session, upon arrival patient was found supine in bed. Patient requiring verbal cues for safety, verbal cues for correct technique, verbal cues for pacing thru activity steps, and frequent rest periods. Patient continues to be functioning below baseline level, occupational performance remains limited secondary to factors listed above  and increased risk for falls and injury. The patient's raw score on the AM-PAC Daily Activity Inpatient Short Form is 13. A raw score of less than 19 suggests the patient may benefit from discharge to post-acute rehabilitation services. Please refer to the recommendation of the Occupational Therapist for safe discharge planning. From OT standpoint, recommendation at time of d/c would be level I maximum resource intensity.     Rehab Resource Intensity Level, OT: I (Maximum Resource Intensity)

## 2024-10-28 NOTE — ASSESSMENT & PLAN NOTE
Lab Results   Component Value Date    HGBA1C 11.0 (H) 10/25/2024       Recent Labs     10/27/24  1051 10/27/24  1609 10/27/24  2058 10/28/24  0738   POCGLU 187* 210* 211* 174*       Blood Sugar Average: Last 72 hrs:  (P) [id number]  Poorly controlled on outpatient regimen  Sliding scale insulin with Accu-Cheks  Target blood glucose 140-180  Diabetic diet  Resume jardiance on dc

## 2024-10-28 NOTE — CASE MANAGEMENT
Case Management Discharge Planning Note    Patient name Ian Medina  Location /426-01 MRN [id number]  : 1945 Date 10/28/2024       Current Admission Date: 10/24/2024  Current Admission Diagnosis:Fractures involving multiple body regions   Patient Active Problem List    Diagnosis Date Noted Date Diagnosed    Metabolic encephalopathy 10/27/2024     Fractures involving multiple body regions 10/25/2024     Type 2 diabetes mellitus (HCC) 10/25/2024     Primary hypertension 10/25/2024     Major depressive disorder 10/25/2024     GERD (gastroesophageal reflux disease) 10/25/2024     Closed fracture of right proximal humerus 10/25/2024     Closed displaced fracture of neck of third metacarpal bone of right hand 10/25/2024       LOS (days): 3  Geometric Mean LOS (GMLOS) (days): 3.8  Days to GMLOS:0.3     OBJECTIVE:  Risk of Unplanned Readmission Score: 16.04         Current admission status: Inpatient   Preferred Pharmacy:   [facility] - [address] [address]  Phone: [phone] Fax: [phone]    Primary Care Provider: No primary care provider on file.    Primary Insurance: MEDICARE  Secondary Insurance: [facility]    DISCHARGE DETAILS:    Discharge Destination Plan:: SNF ([facility])  Transport at Discharge : [facility]        Transported by (Company and Unit #): [facility]  ETA of Transport (Date): 10/28/24  ETA of Transport (Time): 1730

## 2024-10-28 NOTE — DISCHARGE SUMMARY
Discharge Summary - Hospitalist   Name: Ian Medina 78 y.o. male I MRN: [id number]  Unit/Bed#: 426-01 I Date of Admission: 10/24/2024   Date of Service: 10/28/2024 I Hospital Day: 3     Assessment & Plan  Fractures involving multiple body regions  Patient with fall 4 days PTA  Lives at home alone  Imaging revealed right and left humeral head fractures as well as right middle finger and thumb fractures  Right arm/hand splinted and placed in a sling in the ED  Orthopedic surgery consultation appreciated  No surgical intervention indicated  Can continue sling for prox humeral fx, no lifting  Outpatient follow-up in 2 weeks to start PT  Needs f/u with Dr Jesus Mehta for further management of his hand fx  Pain regimen as ordered  PT/OT evaluation and treatment: level I, patient agreeable to rehab, case management to assist with obtaining a facility   Patient accepted to [facility] 10/28/24   Fall precautions  Type 2 diabetes mellitus (HCC)  Lab Results   Component Value Date    HGBA1C 11.0 (H) 10/25/2024       Recent Labs     10/27/24  1609 10/27/24  2058 10/28/24  0738 10/28/24  1058   POCGLU 210* 211* 174* 231*       Blood Sugar Average: Last 72 hrs:  (P) 204.2384818811271346  Poorly controlled on outpatient regimen  Sliding scale insulin with Accu-Cheks  Target blood glucose 140-180  Diabetic diet  Resume jardiance on dc    Primary hypertension  Blood pressures appear somewhat soft for patient's age  Hold amlodipine-benazepril  Continue home beta-blocker    Major depressive disorder  Mood appears appropriate  Continue home Celexa  GERD (gastroesophageal reflux disease)  Stable and chronic in nature  Continue home PPI  Closed fracture of right proximal humerus    Closed displaced fracture of neck of third metacarpal bone of right hand    Metabolic encephalopathy  Intermittent throughout hospitalization  Patient seen today alert & oriented to person, place, situation, year not month  Son does report some  periods of intermittent forgetfulness at home but is typically A&O x 4.   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  Admission Date:   Admission Orders (From admission, onward)       Ordered        10/25/24 0040  Inpatient Admission  Once            10/25/24 0040  INPATIENT ADMISSION  Once                          Discharge Date: 10/28/24    Consultations During Hospital Stay:  Ortho    Procedures Performed:   none    Significant Findings / Test Results:   Right humeral FX    Incidental Findings:          Test Results Pending at Discharge (will require follow up):        Outpatient Tests Requested:      Complications:  None    Reason for Admission: fall    Hospital Course:   Ian Medina is a 78 y.o. male patient who originally presented to the hospital on 10/24/2024 due to a fall at home.  He lives at home and says that he fell 4 days prior to admission.  He complained of some shoulder pain, and after imaging of the right shoulder revealing right humeral fracture, with an old left humeral humeral fracture.    Patient was evaluated by orthopedic surgery who recommended splint, physical therapy along with outpatient follow-up in 2 weeks.  Patient was agreeable to short-term rehab and case management was able to assist with placement at [facility].          Please see above list of diagnoses and related plan for additional information.     Condition at Discharge: fair    Discharge  Day Visit / Exam:       Discussion with Family:     Discharge instructions/Information to patient and family:   See after visit summary for information provided to patient and family.      Provisions for Follow-Up Care:  See after visit summary for information related to follow-up care and any pertinent home health orders.      Mobility at time of Discharge:   Basic Mobility Inpatient Raw Score: 10  JH-HLM Goal: 4: Move to chair/commode  JH-HLM Achieved: 4: Move to chair/commode  HLM Goal NOT achieved. Continue to encourage mobility in post discharge setting.     Disposition:   [facility]    Planned Readmission: no    Discharge Medications:  See after visit summary for reconciled discharge medications provided to patient and/or family.      Administrative Statements   Discharge Statement:  I have spent a total time of 40  minutes in caring for this patient on the day of the visit/encounter. >30 minutes of time was spent on: Diagnostic results, Documenting in the medical record, and Communicating with other healthcare professionals .    **Please Note: This note may have been constructed using a voice recognition system**

## 2024-10-28 NOTE — ASSESSMENT & PLAN NOTE
Patient with fall 4 days PTA  Lives at home alone  Imaging revealed right and left humeral head fractures as well as right middle finger and thumb fractures  Right arm/hand splinted and placed in a sling in the ED  Orthopedic surgery consultation appreciated  No surgical intervention indicated  Can continue sling for prox humeral fx, no lifting  Outpatient follow-up in 2 weeks to start PT  Needs f/u with Dr Jesus Mehta for further management of his hand fx  Pain regimen as ordered  PT/OT evaluation and treatment: level I, patient agreeable to rehab, case management to assist with obtaining a facility   Patient accepted to [facility] 10/28/24   Fall precautions

## 2024-10-28 NOTE — ASSESSMENT & PLAN NOTE
Lab Results   Component Value Date    HGBA1C 11.0 (H) 10/25/2024       Recent Labs     10/27/24  1609 10/27/24  2058 10/28/24  0738 10/28/24  1058   POCGLU 210* 211* 174* 231*       Blood Sugar Average: Last 72 hrs:  (P) 204.2440600719897522  Poorly controlled on outpatient regimen  Sliding scale insulin with Accu-Cheks  Target blood glucose 140-180  Diabetic diet  Resume jardiance on dc

## 2024-10-28 NOTE — ASSESSMENT & PLAN NOTE
Patient with fall 4 days PTA  Lives at home alone  Imaging revealed right and left humeral head fractures as well as right middle finger and thumb fractures  Right arm/hand splinted and placed in a sling in the ED  Orthopedic surgery consultation appreciated  No surgical intervention indicated  Can continue sling for prox humeral fx, no lifting  Outpatient follow-up in 2 weeks to start PT  Needs f/u with Dr Jesus Mehta for further management of his hand fx  Pain regimen as ordered  PT/OT evaluation and treatment: level I, patient agreeable to rehab, case management to assist with obtaining a facility   Fall precautions

## 2024-10-28 NOTE — APP STUDENT NOTE
Assessment and Plan   Fractures of multiple body regions   - Patient lives at home alone, where he fell about a week prior to coming into the hospital    - Imaging revealed right and left humeral head fractures as well as right middle finger and thumb fractures   - Right arm and hand were splinted and placed in a sling in the Emergency Department   - Per orthopedic surgery:    - no surgical intervention is indicated    - Patient may remain in sling for proximal humerus fracture with no lifting    - Patient is to follow up in 2 weeks outpatient in order to begin physical therapy    - Patient was instructed to follow up with  Dr. Jesus Mehta for further management of his hand fractures   - Pain regimen was ordered and patient is currently receiving Tylenol 625mg q 4 hours prn   - PT/OT evaluation and treatment, patient is agreeable to rehab    - Case management consultation for safe disposition planning    - Fall precautions   - Patient is expected to be discharged to rehab today (10/28/2024)   2. Type 2 Diabetes Mellitus     Lab Results   Component Value Date     HGBA1C 11.0 (H) 10/25/2024      Recent Labs  Component      Latest Ref Rng 10/25/2024 10/26/2024 10/27/2024 10/28/2024   POC Glucose      65 - 140 mg/dl 180 (H)  184 (H)  211 (H)  174 (H)    POC Glucose       296 (H)  181 (H)  210 (H)     POC Glucose       194 (H)  224 (H)  187 (H)     POC Glucose       209 (H)  191 (H)  176 (H)     POC Glucose       217 (H)       Legend:  (H) High    - blood sugar poorly controlled on outpatient regimen  - Sliding scale insulin with Accu-Checks  - Target blood glucose 140-180  - Diabetic diet    3. Primary hypertension   - Blood pressures appear somewhat soft for patients age    - Hold amlodipine-benazepril   - Continue beta blocker  4. Major depressive disorder   - Mood appears appropriate   - Continue home Celexa  5. GERD   - Stable and chronic in nature   - Continue home PPI  6. Metabolic encephalopathy   - Intermittent  throughout hospitalization    - Patient seen today and was alert and oriented to person, place, situation, year  - Son does report some periods of intermittent forgetfullness at home but is normally A&O x4  - Suspect pain medication contributory  - Does have known right sided weakness from prior CVA and now with RUE in sling but no focal deficit otherwise  - CT was done on admission and didn't reveal and acute intracranial abnormality. 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 Continue with multidisciplinary rounding and encourage appropriate mobility to improve upon JH-HLM goals.    Patient Centered Rounds: I performed bedside rounds with nursing staff today.     Current Length of Stay: 3 day(s)  Current Patient Status: Inpatient     Code Status: Level 1 - Full Code    Subjective   Patient was seen and examined while he was eating breakfast. Patient does not report any discomfort, numbness, SOB, or chest pain. Patient also does not report any abdominal pain, N/V/D    Objective :{?Quick Links I ICU Summary I Vitals I I/Os I LDAs I Mobility (PT/OT) I Code Status / ACP   ?Quick Links I Active Meds I Pain Meds I Antibiotics I Anticoagulants:84230}  Temp:  [97.7 °F (36.5 °C)-98 °F (36.7 °C)] 97.8 °F (36.6 °C)  HR:  [69-77] 77  BP: (105-128)/(59-64) 105/62  Resp:  [17-19] 17  SpO2:  [93 %-100 %] 98 %  O2 Device: None (Room air)    Body mass index is 28.42 kg/m².     Input and Output Summary (last 24 hours):     Intake/Output Summary (Last 24 hours) at 10/28/2024 0831  Last data filed at 10/28/2024 0305  Gross per 24 hour   Intake 580 ml   Output 3250 ml   Net -2670 ml       Physical Exam  Vitals and nursing note  reviewed.   Constitutional:       General: He is awake.   HENT:      Head: Normocephalic and atraumatic.   Cardiovascular:      Rate and Rhythm: Normal rate and regular rhythm.      Pulses: Normal pulses.      Heart sounds: Normal heart sounds.   Pulmonary:      Effort: Pulmonary effort is normal.      Breath sounds: Normal breath sounds and air entry.   Musculoskeletal:      Right shoulder: Swelling present.   Skin:     General: Skin is warm and dry.   Neurological:      General: No focal deficit present.      Mental Status: He is alert.   Psychiatric:         Attention and Perception: Attention normal.         Mood and Affect: Mood and affect normal.         Speech: Speech normal.         Behavior: Behavior is cooperative.         Thought Content: Thought content normal.       ***    Lines/Drains:            {?Quick Links I Lab Review I Micro Results I Radiology I Cardiology:53512}  Lab Results: I have reviewed the following results:   Results from last 7 days   Lab Units 10/27/24  0414   WBC Thousand/uL 6.17   HEMOGLOBIN g/dL 11.9*   HEMATOCRIT % 36.9   PLATELETS Thousands/uL 220   SEGS PCT % 53   LYMPHO PCT % 29   MONO PCT % 9   EOS PCT % 7*     Results from last 7 days   Lab Units 10/27/24  0414 10/25/24  0539 10/24/24  2111   SODIUM mmol/L 135   < > 134*   POTASSIUM mmol/L 4.4   < > 4.9   CHLORIDE mmol/L 103   < > 100   CO2 mmol/L 22   < > 19*   BUN mg/dL 26*   < > 35*   CREATININE mg/dL 1.32*   < > 1.48*   ANION GAP mmol/L 10   < > 15*   CALCIUM mg/dL 9.3   < > 9.8   ALBUMIN g/dL  --   --  4.1   TOTAL BILIRUBIN mg/dL  --   --  0.98   ALK PHOS U/L  --   --  80   ALT U/L  --   --  17   AST U/L  --   --  18   GLUCOSE RANDOM mg/dL 157*   < > 274*    < > = values in this interval not displayed.     Results from last 7 days   Lab Units 10/24/24  2111   INR  1.05     Results from last 7 days   Lab Units 10/28/24  0738 10/27/24  2058 10/27/24  1609 10/27/24  1051 10/27/24  0704 10/26/24  2103 10/26/24  1552  "10/26/24  1057 10/26/24  0701 10/25/24  2027 10/25/24  1552 10/25/24  1035   POC GLUCOSE mg/dl 174* 211* 210* 187* 176* 184* 181* 224* 191* 180* 296* 194*     Results from last 7 days   Lab Units 10/25/24  0547   HEMOGLOBIN A1C % 11.0*           Recent Cultures (last 7 days):         {Imaging Results Review:61834::\"No pertinent imaging studies reviewed.\"}  {Other Study Results Review:87444::\"No additional pertinent studies reviewed.\"}    Last 24 Hours Medication List:     Current Facility-Administered Medications:     acetaminophen (TYLENOL) tablet 650 mg, Q4H PRN    aspirin chewable tablet 81 mg, Daily    atorvastatin (LIPITOR) tablet 40 mg, Daily With Dinner    citalopram (CeleXA) tablet 40 mg, Daily    heparin (porcine) subcutaneous injection 5,000 Units, Q8H SANDY **AND** [CANCELED] Platelet count, Once    HYDROmorphone (DILAUDID) injection 0.5 mg, Q6H PRN    insulin lispro (HumALOG/ADMELOG) 100 units/mL subcutaneous injection 1-6 Units, TID AC **AND** Fingerstick Glucose (POCT), TID AC    insulin lispro (HumALOG/ADMELOG) 100 units/mL subcutaneous injection 1-6 Units, HS    metoprolol succinate (TOPROL-XL) 24 hr tablet 25 mg, Daily    ondansetron (ZOFRAN) injection 4 mg, Q6H PRN    oxyCODONE (ROXICODONE) immediate release tablet 10 mg, Q6H PRN    oxyCODONE (ROXICODONE) IR tablet 5 mg, Q6H PRN    pantoprazole (PROTONIX) EC tablet 40 mg, Early Morning    polyethylene glycol (MIRALAX) packet 17 g, Daily PRN    Administrative Statements   Today, Patient Was Seen By: Eleanor REILLY  {Time Spent (Optional):29601}    **Please Note: This note may have been constructed using a voice recognition system.**  "

## 2024-10-28 NOTE — PLAN OF CARE
Problem: PHYSICAL THERAPY ADULT  Goal: Performs mobility at highest level of function for planned discharge setting.  See evaluation for individualized goals.  Description: Treatment/Interventions: Functional transfer training, LE strengthening/ROM, Elevations, Therapeutic exercise, Endurance training, Bed mobility, Gait training          See flowsheet documentation for full assessment, interventions and recommendations.  Outcome: Progressing  Note: Prognosis: Good  Problem List: Decreased strength, Decreased endurance, Impaired balance, Decreased mobility, Decreased cognition, Impaired judgement, Decreased safety awareness, Orthopedic restrictions  Assessment: Patient seen this date for PT treatment session to increase level of mobility and functional activity tolerance. This date, pt able to perform all functional mobility with Min A x 1-2 , nai-walker, and increased time. Frequent verbal cuing provided for safety awareness and sequencing. Seated rest break taken following 10' of ambulation d/t fatigue and SOB. HR and SpO2 remained WFL on RA throughout. No true LOB experienced. The patient's AM-PAC Basic Mobility Inpatient Short Form Raw Score is 13. A Raw score of less than or equal to 16 suggests the patient may benefit from discharge to post-acute rehabilitation services. Please also refer to the recommendation of the Physical Therapist for safe discharge planning. Co treatment with OT secondary to complex medical condition of pt, possible A of 2 required to achieve and maintain transitional movements, requiring the need of skilled therapeutic intervention of 2 therapists to achieve delivery of services. D/c recommendation continues to be rehab resource intensity level I.        Rehab Resource Intensity Level, PT: I (Maximum Resource Intensity)    See flowsheet documentation for full assessment.

## 2024-10-28 NOTE — PHYSICAL THERAPY NOTE
"                                                      Physical Therapy Treatment    Patient Name: Ian Medina    Today's Date: 10/28/2024     Problem List  Principal Problem:    Fractures involving multiple body regions  Active Problems:    Type 2 diabetes mellitus (HCC)    Primary hypertension    Major depressive disorder    GERD (gastroesophageal reflux disease)    Closed fracture of right proximal humerus    Closed displaced fracture of neck of third metacarpal bone of right hand    Metabolic encephalopathy       Past Medical History  Past Medical History:   Diagnosis Date    Carotid artery embolism, left     Diabetes mellitus (HCC)     Hypertension     Stroke (HCC)         Past Surgical History  Past Surgical History:   Procedure Laterality Date    CATARACT EXTRACTION, BILATERAL          10/28/24 1026   PT Last Visit   PT Visit Date 10/28/24   Note Type   Note Type Treatment   Pain Assessment   Pain Assessment Tool 0-10   Pain Score No Pain   Restrictions/Precautions   Weight Bearing Precautions Per Order Yes   RUE Weight Bearing Per Order NWB   Braces or Orthoses Sling;Splint   Other Precautions Cognitive;Chair Alarm;WBS;Fall Risk   General   Chart Reviewed Yes   Family/Caregiver Present No   Cognition   Overall Cognitive Status Impaired   Arousal/Participation Alert   Attention Attends with cues to redirect   Orientation Level Oriented to person;Oriented to place;Disoriented to time;Disoriented to situation   Following Commands Follows one step commands with increased time or repetition   Subjective   Subjective \"Okay\"   Bed Mobility   Supine to Sit 4  Minimal assistance   Additional items Assist x 1;Increased time required;Verbal cues;LE management   Sit to Supine   (pt OOB at end of session)   Transfers   Sit to Stand 4  Minimal assistance   Additional items Assist x 2;Increased time required;Verbal cues   Stand to Sit 4  Minimal assistance   Additional items Assist x 2;Increased time required;Verbal " cues   Additional Comments hemiwalker used   Ambulation/Elevation   Gait pattern Excessively slow;Short stride;Foward flexed;Decreased foot clearance;Wide BASILIO;Improper Weight shift;Poor UE support   Gait Assistance 4  Minimal assist   Additional items Assist x 2;Verbal cues   Assistive Device Vasyl-walker   Distance 10'   Balance   Static Sitting Good   Dynamic Sitting Fair +   Static Standing Fair -   Dynamic Standing Fair -   Ambulatory Poor +  (with vasyl-walker)   Endurance Deficit   Endurance Deficit Yes   Endurance Deficit Description pt appears fatigued with minimal ambulation   Activity Tolerance   Activity Tolerance Patient limited by fatigue   Assessment   Prognosis Good   Problem List Decreased strength;Decreased endurance;Impaired balance;Decreased mobility;Decreased cognition;Impaired judgement;Decreased safety awareness;Orthopedic restrictions   Assessment Patient seen this date for PT treatment session to increase level of mobility and functional activity tolerance. This date, pt able to perform all functional mobility with Min A x 1-2 , vasyl-walker, and increased time. Frequent verbal cuing provided for safety awareness and sequencing. Seated rest break taken following 10' of ambulation d/t fatigue and SOB. HR and SpO2 remained WFL on RA throughout. No true LOB experienced. The patient's AM-PAC Basic Mobility Inpatient Short Form Raw Score is 13. A Raw score of less than or equal to 16 suggests the patient may benefit from discharge to post-acute rehabilitation services. Please also refer to the recommendation of the Physical Therapist for safe discharge planning. Co treatment with OT secondary to complex medical condition of pt, possible A of 2 required to achieve and maintain transitional movements, requiring the need of skilled therapeutic intervention of 2 therapists to achieve delivery of services. D/c recommendation continues to be rehab resource intensity level I.   Goals   LTG Expiration Date  11/08/24   PT Treatment Day 1   Plan   Treatment/Interventions Functional transfer training;LE strengthening/ROM;Therapeutic exercise;Elevations;Endurance training;Bed mobility;Gait training   Progress Progressing toward goals   PT Frequency 3-5x/wk   Discharge Recommendation   Rehab Resource Intensity Level, PT I (Maximum Resource Intensity)   AM-PAC Basic Mobility Inpatient   Turning in Flat Bed Without Bedrails 2   Lying on Back to Sitting on Edge of Flat Bed Without Bedrails 2   Moving Bed to Chair 3   Standing Up From Chair Using Arms 3   Walk in Room 2   Climb 3-5 Stairs With Railing 1   Basic Mobility Inpatient Raw Score 13   Basic Mobility Standardized Score 33.99   [facility] Highest Level Of Mobility   -[facility] Goal 4: Move to chair/commode   -[facility] Achieved 6: Walk 10 steps or more   Education   Education Provided Mobility training;Assistive device   Patient Demonstrates acceptance/verbal understanding;Reinforcement needed   End of Consult   Patient Position at End of Consult Bedside chair;All needs within reach;Bed/Chair alarm activated

## 2024-10-28 NOTE — OCCUPATIONAL THERAPY NOTE
Occupational Therapy Progress Note     Patient Name: Ian Medina  Today's Date: 10/28/2024  Problem List  Principal Problem:    Fractures involving multiple body regions  Active Problems:    Type 2 diabetes mellitus (HCC)    Primary hypertension    Major depressive disorder    GERD (gastroesophageal reflux disease)    Closed fracture of right proximal humerus    Closed displaced fracture of neck of third metacarpal bone of right hand    Metabolic encephalopathy              10/28/24 1025   OT Last Visit   OT Visit Date 10/28/24   Note Type   Note Type Treatment for insurance authorization   Pain Assessment   Pain Score No Pain   Restrictions/Precautions   Weight Bearing Precautions Per Order Yes   RUE Weight Bearing Per Order NWB   Braces or Orthoses Sling;Splint   Other Precautions Cognitive;Chair Alarm;Bed Alarm;Fall Risk;Pain   ADL   Where Assessed Edge of bed   Grooming Assistance 5  Supervision/Setup   Grooming Deficit Brushing hair   Grooming Comments declines teeth care and reports he does not perform oral care at baseline   LB Dressing Assistance 2  Maximal Assistance   LB Dressing Deficit Don/doff R sock;Don/doff L sock;Thread RLE into underwear;Thread LLE into underwear;Pull up over hips   Bed Mobility   Supine to Sit 4  Minimal assistance   Additional items Assist x 1;Bedrails;Increased time required;Verbal cues;LE management   Sit to Supine   (seated in chair at end of session)   Additional Comments pt on RA during session; SPO2 WFL with no complaints of SOB   Transfers   Sit to Stand 4  Minimal assistance   Additional items Increased time required;Verbal cues;Assist x 2   Stand to Sit 4  Minimal assistance   Additional items Assist x 2;Increased time required;Verbal cues   Additional Comments pt iniitally utilizes handheld (A), however also utilizes hemiwalker with increased stability   Functional Mobility   Functional Mobility 4  Minimal assistance   Additional Comments x1-2 with use of  "handheld (A) ~5ft and significant instability and ataxia of R LE with mild pain complaints; seated rest break, and performs ~10ft with hemiwalker and midl instability with no significant LOB, however poor safety   Additional items Hemiwalker;Hand hold assistance   Subjective   Subjective \"I wasn't up all weekend\"   Cognition   Overall Cognitive Status Impaired   Arousal/Participation Alert;Cooperative   Attention Attends with cues to redirect   Orientation Level Oriented to person;Oriented to place;Disoriented to time;Disoriented to situation   Memory Decreased long term memory;Decreased short term memory;Decreased recall of recent events   Following Commands Follows one step commands with increased time or repetition   Comments pt is slow to respond at times   Activity Tolerance   Activity Tolerance Patient limited by fatigue;Patient limited by pain   Assessment   Assessment Patient participated in Skilled OT session this date with interventions consisting of ADL re training with the use of correct body mechnaics, safety awareness and fall prevention techniques, one handed dressing technique,  therapeutic activities to: increase activity tolerance, increase standing tolerance time with unilateral UE support to complete sink level ADLs, increase dynamic sit/ stand balance during functional activity , increase postural control, increase trunk control, and increase OOB/ sitting tolerance . Co treatment with PT secondary to complex medical condition of pt, mod-max A of 2 required to achieve and maintain transitional movements, requiring the need of skilled therapeutic intervention of 2 therapists to achieve delivery of services. Patient agreeable to OT treatment session, upon arrival patient was found supine in bed. Patient requiring verbal cues for safety, verbal cues for correct technique, verbal cues for pacing thru activity steps, and frequent rest periods. Patient continues to be functioning below baseline level, " occupational performance remains limited secondary to factors listed above and increased risk for falls and injury. The patient's raw score on the AM-PAC Daily Activity Inpatient Short Form is 13. A raw score of less than 19 suggests the patient may benefit from discharge to post-acute rehabilitation services. Please refer to the recommendation of the Occupational Therapist for safe discharge planning. From OT standpoint, recommendation at time of d/c would be level I maximum resource intensity.   Plan   Goal Expiration Date 11/08/24   OT Treatment Day 1   OT Frequency 3-5x/wk   Discharge Recommendation   Rehab Resource Intensity Level, OT I (Maximum Resource Intensity)   AM-PAC Daily Activity Inpatient   Lower Body Dressing 2   Bathing 2   Toileting 2   Upper Body Dressing 2   Grooming 2   Eating 3   Daily Activity Raw Score 13   Daily Activity Standardized Score (Calc for Raw Score >=11) 32.03   -[facility] Applied Cognition Inpatient   Following a Speech/Presentation 3   Understanding Ordinary Conversation 3   Taking Medications 2   Remembering Where Things Are Placed or Put Away 2   Remembering List of 4-5 Errands 1   Taking Care of Complicated Tasks 1   Applied Cognition Raw Score 12   Applied Cognition Standardized Score 28.82

## 2024-10-28 NOTE — PLAN OF CARE
Problem: PAIN - ADULT  Goal: Verbalizes/displays adequate comfort level or baseline comfort level  Description: Interventions:  - Encourage patient to monitor pain and request assistance  - Assess pain using appropriate pain scale (0-10 pain scale)  - Administer analgesics based on type and severity of pain and evaluate response  - Implement non-pharmacological measures as appropriate and evaluate response  - Consider cultural and social influences on pain and pain management  - Notify physician/advanced practitioner if interventions unsuccessful or patient reports new pain  Outcome: Progressing     Problem: SAFETY ADULT  Goal: Patient will remain free of falls  Description: INTERVENTIONS:  - Educate patient/family on patient safety including physical limitations  - Instruct patient to call for assistance with activity (max assist)  - Consult OT/PT to assist with strengthening/mobility   - Keep Call bell within reach  - Keep bed low and locked with side rails adjusted as appropriate  - Keep care items and personal belongings within reach  - Initiate and maintain comfort rounds  - Make Fall Risk Sign visible to staff (high fall risk)  - Offer Toileting every 1-2 Hours, in advance of need  - Initiate/Maintain bed/chair alarm  - Obtain necessary fall risk management equipment: nonskid footwear, gait belt  - Apply yellow socks and bracelet for high fall risk patients  - Consider moving patient to room near nurses station  Outcome: Progressing

## 2024-10-28 NOTE — PROGRESS NOTES
Progress Note - Hospitalist   Name: Ian Medina 78 y.o. male I MRN: [id number]  Unit/Bed#: 426-01 I Date of Admission: 10/24/2024   Date of Service: 10/28/2024 I Hospital Day: 3    Assessment & Plan  Fractures involving multiple body regions  Patient with fall 4 days PTA  Lives at home alone  Imaging revealed right and left humeral head fractures as well as right middle finger and thumb fractures  Right arm/hand splinted and placed in a sling in the ED  Orthopedic surgery consultation appreciated  No surgical intervention indicated  Can continue sling for prox humeral fx, no lifting  Outpatient follow-up in 2 weeks to start PT  Needs f/u with Dr Jesus Mehta for further management of his hand fx  Pain regimen as ordered  PT/OT evaluation and treatment: level I, patient agreeable to rehab, case management to assist with obtaining a facility   Fall precautions  Type 2 diabetes mellitus (HCC)  Lab Results   Component Value Date    HGBA1C 11.0 (H) 10/25/2024       Recent Labs     10/27/24  1051 10/27/24  1609 10/27/24  2058 10/28/24  0738   POCGLU 187* 210* 211* 174*       Blood Sugar Average: Last 72 hrs:  (P) [id number]  Poorly controlled on outpatient regimen  Sliding scale insulin with Accu-Cheks  Target blood glucose 140-180  Diabetic diet  Resume jardiance on dc    Primary hypertension  Blood pressures appear somewhat soft for patient's age  Hold amlodipine-benazepril  Continue home beta-blocker    Major depressive disorder  Mood appears appropriate  Continue home Celexa  GERD (gastroesophageal reflux disease)  Stable and chronic in nature  Continue home PPI  Closed fracture of right proximal humerus    Closed displaced fracture of neck of third metacarpal bone of right hand    Metabolic encephalopathy  Intermittent throughout hospitalization  Patient seen today alert & oriented to person, place, situation, year not month  Son does report some periods of intermittent forgetfulness at home but is  typically A&O x 4.   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    Mobility:   Basic Mobility Inpatient Raw Score: 10  JH-HLM Goal: 4: Move to chair/commode  JH-HLM Achieved: 4: Move to chair/commode  JH-HLM Goal NOT achieved. Continue with multidisciplinary rounding and encourage appropriate mobility to improve upon JH-HLM goals.    Patient Centered Rounds: I performed bedside rounds with nursing staff today.   Discussions with Specialists or Other Care Team Provider: case mgmt    Education and Discussions with Family / Patient:     Current Length of Stay: 3 day(s)  Current Patient Status: Inpatient   Certification Statement: The patient will continue to require additional inpatient hospital stay due to placement  Discharge Plan: Tomorrow to [facility] pending auth    Code Status: Level 1 - Full Code    Subjective   Seen and examined, no complaints, pain controlled     Objective :  Temp:  [97.7 °F (36.5 °C)-98 °F (36.7 °C)] 97.8 °F (36.6 °C)  HR:  [69-77] 77  BP: (105-128)/(59-64) 105/62  Resp:  [17-19] 17  SpO2:  [93 %-100 %] 98 %  O2 Device: None (Room air)    Body mass index is 28.42 kg/m².     Input and Output Summary (last 24 hours):     Intake/Output Summary (Last 24 hours) at 10/28/2024 0919  Last data filed at 10/28/2024 0305  Gross per 24 hour   Intake 400 ml   Output 2950 ml   Net -2550 ml       Physical Exam  Vitals and nursing note reviewed.   HENT:      Head: Normocephalic.   Cardiovascular:      Rate and Rhythm: Normal rate and regular  rhythm.      Pulses: Normal pulses.      Heart sounds: No murmur heard.  Pulmonary:      Effort: Pulmonary effort is normal. No respiratory distress.      Breath sounds: Normal breath sounds. No wheezing or rales.   Abdominal:      General: Abdomen is flat. Bowel sounds are normal. There is no distension.      Tenderness: There is no abdominal tenderness. There is no guarding.   Musculoskeletal:      Right lower leg: No edema.      Left lower leg: No edema.   Skin:     Capillary Refill: Capillary refill takes 2 to 3 seconds.   Neurological:      General: No focal deficit present.      Mental Status: He is alert and oriented to person, place, and time.           Lines/Drains:              Lab Results: I have reviewed the following results:   Results from last 7 days   Lab Units 10/27/24  0414   WBC Thousand/uL 6.17   HEMOGLOBIN g/dL 11.9*   HEMATOCRIT % 36.9   PLATELETS Thousands/uL 220   SEGS PCT % 53   LYMPHO PCT % 29   MONO PCT % 9   EOS PCT % 7*     Results from last 7 days   Lab Units 10/27/24  0414 10/25/24  0539 10/24/24  2111   SODIUM mmol/L 135   < > 134*   POTASSIUM mmol/L 4.4   < > 4.9   CHLORIDE mmol/L 103   < > 100   CO2 mmol/L 22   < > 19*   BUN mg/dL 26*   < > 35*   CREATININE mg/dL 1.32*   < > 1.48*   ANION GAP mmol/L 10   < > 15*   CALCIUM mg/dL 9.3   < > 9.8   ALBUMIN g/dL  --   --  4.1   TOTAL BILIRUBIN mg/dL  --   --  0.98   ALK PHOS U/L  --   --  80   ALT U/L  --   --  17   AST U/L  --   --  18   GLUCOSE RANDOM mg/dL 157*   < > 274*    < > = values in this interval not displayed.     Results from last 7 days   Lab Units 10/24/24  2111   INR  1.05     Results from last 7 days   Lab Units 10/28/24  0738 10/27/24  2058 10/27/24  1609 10/27/24  1051 10/27/24  0704 10/26/24  2103 10/26/24  1552 10/26/24  1057 10/26/24  0701 10/25/24  2027 10/25/24  1552 10/25/24  1035   POC GLUCOSE mg/dl 174* 211* 210* 187* 176* 184* 181* 224* 191* 180* 296* 194*     Results from last 7 days   Lab Units 10/25/24  0584    HEMOGLOBIN A1C % 11.0*           Recent Cultures (last 7 days):         Imaging Results Review: I personally reviewed the following image studies in PACS and associated radiology reports: CT abdomen/pelvis. My interpretation of the radiology images/reports is: no abdominal trauama.      Last 24 Hours Medication List:     Current Facility-Administered Medications:     acetaminophen (TYLENOL) tablet 650 mg, Q4H PRN    aspirin chewable tablet 81 mg, Daily    atorvastatin (LIPITOR) tablet 40 mg, Daily With Dinner    citalopram (CeleXA) tablet 40 mg, Daily    heparin (porcine) subcutaneous injection 5,000 Units, Q8H SANDY **AND** [CANCELED] Platelet count, Once    HYDROmorphone (DILAUDID) injection 0.5 mg, Q6H PRN    insulin lispro (HumALOG/ADMELOG) 100 units/mL subcutaneous injection 1-6 Units, TID AC **AND** Fingerstick Glucose (POCT), TID AC    insulin lispro (HumALOG/ADMELOG) 100 units/mL subcutaneous injection 1-6 Units, HS    metoprolol succinate (TOPROL-XL) 24 hr tablet 25 mg, Daily    ondansetron (ZOFRAN) injection 4 mg, Q6H PRN    oxyCODONE (ROXICODONE) immediate release tablet 10 mg, Q6H PRN    oxyCODONE (ROXICODONE) IR tablet 5 mg, Q6H PRN    pantoprazole (PROTONIX) EC tablet 40 mg, Early Morning    polyethylene glycol (MIRALAX) packet 17 g, Daily PRN    Administrative Statements   Today, Patient Was Seen By: Jerod Jaime MD      **Please Note: This note may have been constructed using a voice recognition system.**